# Patient Record
Sex: FEMALE | Race: WHITE | NOT HISPANIC OR LATINO | Employment: FULL TIME | URBAN - METROPOLITAN AREA
[De-identification: names, ages, dates, MRNs, and addresses within clinical notes are randomized per-mention and may not be internally consistent; named-entity substitution may affect disease eponyms.]

---
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
## 2022-12-06 ENCOUNTER — HOSPITAL ENCOUNTER (EMERGENCY)
Facility: HOSPITAL | Age: 22
Discharge: HOME | End: 2022-12-06
Attending: EMERGENCY MEDICINE
Payer: MEDICAID

## 2022-12-06 VITALS
OXYGEN SATURATION: 96 % | RESPIRATION RATE: 18 BRPM | HEART RATE: 115 BPM | BODY MASS INDEX: 53.66 KG/M2 | TEMPERATURE: 99 F | SYSTOLIC BLOOD PRESSURE: 133 MMHG | WEIGHT: 284 LBS | DIASTOLIC BLOOD PRESSURE: 86 MMHG

## 2022-12-06 DIAGNOSIS — R03.0 ELEVATED BP WITHOUT DIAGNOSIS OF HYPERTENSION: ICD-10-CM

## 2022-12-06 DIAGNOSIS — M25.511 ACUTE PAIN OF RIGHT SHOULDER: ICD-10-CM

## 2022-12-06 DIAGNOSIS — S46.911A STRAIN OF RIGHT SHOULDER, INITIAL ENCOUNTER: Primary | ICD-10-CM

## 2022-12-06 LAB
B-HCG UR QL: NEGATIVE
CTP QC/QA: YES

## 2022-12-06 PROCEDURE — 96372 THER/PROPH/DIAG INJ SC/IM: CPT | Performed by: EMERGENCY MEDICINE

## 2022-12-06 PROCEDURE — 99284 EMERGENCY DEPT VISIT MOD MDM: CPT | Mod: ER

## 2022-12-06 PROCEDURE — 81025 URINE PREGNANCY TEST: CPT | Mod: ER | Performed by: PHYSICIAN ASSISTANT

## 2022-12-06 PROCEDURE — 63600175 PHARM REV CODE 636 W HCPCS: Mod: ER | Performed by: EMERGENCY MEDICINE

## 2022-12-06 RX ORDER — METHOCARBAMOL 500 MG/1
1000 TABLET, FILM COATED ORAL 3 TIMES DAILY PRN
Qty: 20 TABLET | Refills: 0 | Status: SHIPPED | OUTPATIENT
Start: 2022-12-06 | End: 2022-12-11

## 2022-12-06 RX ORDER — KETOROLAC TROMETHAMINE 30 MG/ML
15 INJECTION, SOLUTION INTRAMUSCULAR; INTRAVENOUS
Status: COMPLETED | OUTPATIENT
Start: 2022-12-06 | End: 2022-12-06

## 2022-12-06 RX ORDER — METFORMIN HYDROCHLORIDE 500 MG/1
500 TABLET ORAL 2 TIMES DAILY WITH MEALS
COMMUNITY
End: 2023-06-28

## 2022-12-06 RX ORDER — NAPROXEN 500 MG/1
500 TABLET ORAL 2 TIMES DAILY PRN
Qty: 14 TABLET | Refills: 0 | OUTPATIENT
Start: 2022-12-06 | End: 2023-06-28

## 2022-12-06 RX ADMIN — KETOROLAC TROMETHAMINE 15 MG: 30 INJECTION, SOLUTION INTRAMUSCULAR at 11:12

## 2022-12-07 NOTE — DISCHARGE INSTRUCTIONS
Additional instructions  Followup with your primary care physician in 2-3 days if you are not improving. Take all your medications as prescribed. Return to the emergency department if you have increasing pain, chest pain, difficulty breathing,  nonstop vomiting, or any other concerns. Be sure to drink plenty of fluids to stay hydrated. Get plenty of rest. Please refer to additional educational material for further instructions.    Your blood pressure was over > 120/80 without history of hypertension you are advised to follow up with your PCP.  While elevated Blood pressures can be caused by many things including just coming to the emergency department, you will need to follow up with your primary care physician for further evaluation ideally in 2-3 days.

## 2022-12-07 NOTE — ED PROVIDER NOTES
Chief Complaint: right shoulder pain     History of Present Illness:    Nola Rooney 22 y.o. with a  has no past medical history Body mass index is 53.66 kg/m². who presents to the emergency department today with a complaint of right shoulder pain after a fall at work. Pt reports that she slipped on some water and fell to the ground hitting her left shoulder. She denies hitting her head. No LOC. This happened at 2pm. She worked through the rest of her shift. She did not take anything for pain prior to coming in tonight. Pain is worse with movement. No numbness or tingling to the hand. No other complaints.     ROS    Constitutional: No fever, no chills.  Eyes: No discharge. No pain.  ENT: No nasal drainage. No ear ache. No sore throat.  Cardiovascular: No chest pain, no palpitations.  Respiratory: No cough, no shortness of breath.  Gastrointestinal: No abdominal pain, no vomiting. No diarrhea.  Genitourinary: No hematuria, dysuria, urgency.  Musculoskeletal: No back pain.   Skin: No rashes, no lesions.  Neurological: No headache, no focal weakness.  Hematologic: Does not bleed easily.     Otherwise remaining ROS negative     The history is provided by the patient      Reviewed and verified by myself:   PMH/PSH/SOC/FH REVIEWED :  Nola Rooney  has no past medical history on file. and   has a past surgical history that includes Tonsillectomy and adnoids. with  reports that she is a non-smoker but has been exposed to tobacco smoke. She has never used smokeless tobacco. She reports that she does not drink alcohol and does not use drugs. and a family history includes Hypertension in her father and mother.      Nursing/Ancillary staff note reviewed.  ALLERGIES REVIEWED  MEDICATIONS REVIEWED  VS reviewed         Physical Exam     ED Triage Vitals [12/06/22 2122]   /86   Pulse (!) 115   Resp 18   Temp 99.1 °F (37.3 °C)   SpO2 96 %         Physical Exam  Vitals and nursing note reviewed.   Constitutional:        General: She is not in acute distress.     Appearance: Normal appearance. She is well-developed.   HENT:      Head: Normocephalic and atraumatic.      Right Ear: External ear normal.      Left Ear: External ear normal.      Nose: Nose normal.      Mouth/Throat:      Mouth: Mucous membranes are moist.   Eyes:      General: No scleral icterus.        Right eye: No discharge.         Left eye: No discharge.      Conjunctiva/sclera: Conjunctivae normal.   Cardiovascular:      Rate and Rhythm: Normal rate.   Pulmonary:      Effort: Pulmonary effort is normal. No respiratory distress.   Abdominal:      General: There is no distension.   Musculoskeletal:         General: Normal range of motion.      Right shoulder: Tenderness (along the anterior musculature) present. No swelling, deformity or bony tenderness. Normal strength. Normal pulse.        Arms:       Cervical back: Normal range of motion and neck supple.      Comments: Gait normal.    Skin:     General: Skin is warm and dry.   Neurological:      Mental Status: She is alert and oriented to person, place, and time.      Cranial Nerves: No cranial nerve deficit.      Motor: No abnormal muscle tone.         DIFFERENTIAL DIAGNOSIS: After history and physical exam a differential diagnosis was considered, but was not limited to, strain, sprain, fracture, dislocation            I independently interpreted the lab results and it showed the following: UPT Neg         Imaging Results              X-ray Shoulder 2 or More Views Right (Final result)  Result time 12/06/22 22:30:26      Final result by Tim Diaz MD (12/06/22 22:30:26)                   Impression:      As above      Electronically signed by: Joe Dumont  Date:    12/06/2022  Time:    22:30               Narrative:    EXAMINATION:  XR SHOULDER COMPLETE 2 OR MORE VIEWS RIGHT    CLINICAL HISTORY:  Right shoulder pain;    TECHNIQUE:  Two or three views of the right shoulder were  performed.    COMPARISON:  None    FINDINGS:  Limited positioning with arm extended.  No definite evidence for acute fracture or dislocation.  Recommend clinical correlation and follow-up as clinically indicated.                                                    ED Course     Medications   ketorolac injection 15 mg (15 mg Intramuscular Given 12/6/22 2300)                     Medical Decision Making:         Clinical Tests:   Radiological Study: Ordered and Reviewed      ED Management:  Nola Rooney  presents to the emergency Department today with strain to her right shoulder, after a fall at work. Umberto without signs of fracture, dislocation. She is relieved. I will write her for pain/inflammation control and she'll follow up with her PCP.  The pt had an elevated blood pressure here in the emergency department.  While this could be causing many different things such as pain and even just being in the emergency department the patient understands the need to follow-up with his PCP for further management.  They will need to follow up with their PCP for further evaluation and management in 2-3 days. The pt is comfortable with this plan and comfortable going home at this time. After taking into careful account the historical factors and physical exam findings of the patient's presentation today, in conjunction with the empirical and objective data obtained on ED workup, no acute emergent medical condition requiring admission has been identified. The patient appears to be low risk for an emergent medical condition and I feel it is safe and appropriate at this time for the patient to be discharged to follow-up as detailed in their discharge instructions for reevaluation and possible continued outpatient workup and management. Regardless, an unremarkable evaluation in the ED does not preclude the development or presence of a serious or life threatening condition. As such, patient was instructed to return immediately for  any worsening or change in current symptoms. Precautions for return discussed at length.  Discharge and follow-up instructions discussed with the patient who expressed understanding and willingness to comply with my recommendations.    Voice recognition software utilized in this note.              Impression      The primary encounter diagnosis was Strain of right shoulder, initial encounter. Diagnoses of Acute pain of right shoulder and Elevated BP without diagnosis of hypertension were also pertinent to this visit.          New Prescriptions    METHOCARBAMOL (ROBAXIN) 500 MG TAB    Take 2 tablets (1,000 mg total) by mouth 3 (three) times daily as needed (muscle pain or spasm).    NAPROXEN (NAPROSYN) 500 MG TABLET    Take 1 tablet (500 mg total) by mouth 2 (two) times daily as needed (pain).        Follow-up Information       Your PCP. Schedule an appointment as soon as possible for a visit in 3 days.                                          Mally Prajapati MD  12/06/22 9742

## 2022-12-15 ENCOUNTER — HOSPITAL ENCOUNTER (EMERGENCY)
Facility: HOSPITAL | Age: 22
Discharge: HOME | End: 2022-12-16
Attending: STUDENT IN AN ORGANIZED HEALTH CARE EDUCATION/TRAINING PROGRAM
Payer: MEDICAID

## 2022-12-15 DIAGNOSIS — R07.9 CHEST PAIN: ICD-10-CM

## 2022-12-15 LAB
B-HCG UR QL: NEGATIVE
CTP QC/QA: YES
INFLUENZA A, MOLECULAR: NEGATIVE
INFLUENZA B, MOLECULAR: NEGATIVE
SARS-COV-2 RDRP RESP QL NAA+PROBE: NEGATIVE
SPECIMEN SOURCE: NORMAL

## 2022-12-15 PROCEDURE — 25000003 PHARM REV CODE 250: Mod: ER | Performed by: STUDENT IN AN ORGANIZED HEALTH CARE EDUCATION/TRAINING PROGRAM

## 2022-12-15 PROCEDURE — 99285 EMERGENCY DEPT VISIT HI MDM: CPT | Mod: 25,ER

## 2022-12-15 PROCEDURE — 93010 EKG 12-LEAD: ICD-10-PCS | Mod: ,,, | Performed by: INTERNAL MEDICINE

## 2022-12-15 PROCEDURE — 96361 HYDRATE IV INFUSION ADD-ON: CPT | Mod: ER

## 2022-12-15 PROCEDURE — 81025 URINE PREGNANCY TEST: CPT | Mod: ER | Performed by: STUDENT IN AN ORGANIZED HEALTH CARE EDUCATION/TRAINING PROGRAM

## 2022-12-15 PROCEDURE — U0002 COVID-19 LAB TEST NON-CDC: HCPCS | Mod: ER | Performed by: STUDENT IN AN ORGANIZED HEALTH CARE EDUCATION/TRAINING PROGRAM

## 2022-12-15 PROCEDURE — 93010 ELECTROCARDIOGRAM REPORT: CPT | Mod: ,,, | Performed by: INTERNAL MEDICINE

## 2022-12-15 PROCEDURE — 87502 INFLUENZA DNA AMP PROBE: CPT | Mod: ER | Performed by: STUDENT IN AN ORGANIZED HEALTH CARE EDUCATION/TRAINING PROGRAM

## 2022-12-15 PROCEDURE — 93005 ELECTROCARDIOGRAM TRACING: CPT | Mod: ER

## 2022-12-15 RX ADMIN — SODIUM CHLORIDE 1000 ML: 0.9 INJECTION, SOLUTION INTRAVENOUS at 11:12

## 2022-12-16 VITALS
OXYGEN SATURATION: 98 % | HEIGHT: 60 IN | BODY MASS INDEX: 55.76 KG/M2 | RESPIRATION RATE: 24 BRPM | WEIGHT: 284 LBS | DIASTOLIC BLOOD PRESSURE: 68 MMHG | HEART RATE: 114 BPM | SYSTOLIC BLOOD PRESSURE: 123 MMHG | TEMPERATURE: 99 F

## 2022-12-16 LAB
ANION GAP SERPL CALC-SCNC: 9 MMOL/L (ref 8–16)
BASOPHILS # BLD AUTO: 0.06 K/UL (ref 0–0.2)
BASOPHILS NFR BLD: 0.3 % (ref 0–1.9)
CALCIUM SERPL-MCNC: 9.4 MG/DL (ref 8.7–10.5)
CHLORIDE SERPL-SCNC: 105 MMOL/L (ref 95–110)
CO2 SERPL-SCNC: 26 MMOL/L (ref 23–29)
CREAT SERPL-MCNC: 0.59 MG/DL (ref 0.5–1.4)
D DIMER PPP IA.FEU-MCNC: 0.19 MG/L FEU
DIFFERENTIAL METHOD: ABNORMAL
EOSINOPHIL # BLD AUTO: 0 K/UL (ref 0–0.5)
EOSINOPHIL NFR BLD: 0.2 % (ref 0–8)
ERYTHROCYTE [DISTWIDTH] IN BLOOD BY AUTOMATED COUNT: 13.7 % (ref 11.5–14.5)
EST. GFR  (NO RACE VARIABLE): >60 ML/MIN/1.73 M^2
GLUCOSE SERPL-MCNC: 106 MG/DL (ref 70–110)
HCT VFR BLD AUTO: 42 % (ref 37–48.5)
HGB BLD-MCNC: 13.4 G/DL (ref 12–16)
IMM GRANULOCYTES # BLD AUTO: 0.06 K/UL (ref 0–0.04)
IMM GRANULOCYTES NFR BLD AUTO: 0.3 % (ref 0–0.5)
LYMPHOCYTES # BLD AUTO: 3.2 K/UL (ref 1–4.8)
LYMPHOCYTES NFR BLD: 17.7 % (ref 18–48)
MCH RBC QN AUTO: 27.5 PG (ref 27–31)
MCHC RBC AUTO-ENTMCNC: 31.9 G/DL (ref 32–36)
MCV RBC AUTO: 86 FL (ref 82–98)
MONOCYTES # BLD AUTO: 0.9 K/UL (ref 0.3–1)
MONOCYTES NFR BLD: 4.8 % (ref 4–15)
NEUTROPHILS # BLD AUTO: 13.7 K/UL (ref 1.8–7.7)
NEUTROPHILS NFR BLD: 76.7 % (ref 38–73)
NRBC BLD-RTO: 0 /100 WBC
PLATELET # BLD AUTO: 401 K/UL (ref 150–450)
PMV BLD AUTO: 11.1 FL (ref 9.2–12.9)
POTASSIUM SERPL-SCNC: 4.3 MMOL/L (ref 3.5–5.1)
RBC # BLD AUTO: 4.88 M/UL (ref 4–5.4)
SODIUM SERPL-SCNC: 140 MMOL/L (ref 136–145)
TROPONIN I SERPL-MCNC: <0.012 NG/ML (ref 0.01–0.03)
UUN UR-MCNC: 11 MG/DL (ref 7–17)
WBC # BLD AUTO: 17.83 K/UL (ref 3.9–12.7)

## 2022-12-16 PROCEDURE — 84484 ASSAY OF TROPONIN QUANT: CPT | Mod: ER | Performed by: STUDENT IN AN ORGANIZED HEALTH CARE EDUCATION/TRAINING PROGRAM

## 2022-12-16 PROCEDURE — 85025 COMPLETE CBC W/AUTO DIFF WBC: CPT | Mod: ER | Performed by: STUDENT IN AN ORGANIZED HEALTH CARE EDUCATION/TRAINING PROGRAM

## 2022-12-16 PROCEDURE — 96374 THER/PROPH/DIAG INJ IV PUSH: CPT | Mod: ER

## 2022-12-16 PROCEDURE — 63600175 PHARM REV CODE 636 W HCPCS: Mod: ER | Performed by: STUDENT IN AN ORGANIZED HEALTH CARE EDUCATION/TRAINING PROGRAM

## 2022-12-16 PROCEDURE — 80048 BASIC METABOLIC PNL TOTAL CA: CPT | Mod: ER | Performed by: STUDENT IN AN ORGANIZED HEALTH CARE EDUCATION/TRAINING PROGRAM

## 2022-12-16 PROCEDURE — 85379 FIBRIN DEGRADATION QUANT: CPT | Mod: ER | Performed by: STUDENT IN AN ORGANIZED HEALTH CARE EDUCATION/TRAINING PROGRAM

## 2022-12-16 RX ORDER — KETOROLAC TROMETHAMINE 30 MG/ML
15 INJECTION, SOLUTION INTRAMUSCULAR; INTRAVENOUS
Status: COMPLETED | OUTPATIENT
Start: 2022-12-16 | End: 2022-12-16

## 2022-12-16 RX ORDER — KETOROLAC TROMETHAMINE 10 MG/1
10 TABLET, FILM COATED ORAL EVERY 8 HOURS
Qty: 15 TABLET | Refills: 0 | Status: SHIPPED | OUTPATIENT
Start: 2022-12-16 | End: 2022-12-21

## 2022-12-16 RX ADMIN — KETOROLAC TROMETHAMINE 15 MG: 30 INJECTION, SOLUTION INTRAMUSCULAR at 01:12

## 2022-12-16 NOTE — ED PROVIDER NOTES
Encounter Date: 12/15/2022       History     Chief Complaint   Patient presents with    Chest Pain     Pt to the Er with c/o chest pain for 1 hour PTA. Pt reports she was at work when it started, took a Naproxen with no relief. Pt reports coughing and burning in chest, nausea also.      22-year-old female with PCOS presents for evaluation due to chest pain onset approximately 1 hour ago.  Onset of work in the midst of a coughing episode.  Close contact recently diagnosed with influenza.    The history is provided by the patient and medical records.   Chest Pain  The current episode started 1 to 2 hours ago. Chest pain occurs constantly. The chest pain is unchanged. The pain is associated with coughing. The quality of the pain is described as aching. Primary symptoms include cough and nausea. Pertinent negatives for primary symptoms include no fever, no fatigue, no shortness of breath, no palpitations, no abdominal pain, no vomiting, no dizziness and no altered mental status.   Pertinent negatives for associated symptoms include no numbness and no weakness.   Review of patient's allergies indicates:   Allergen Reactions    Sulfa (sulfonamide antibiotics)      Past Medical History:   Diagnosis Date    Polycystic ovarian disease      Past Surgical History:   Procedure Laterality Date    adnoids      TONSILLECTOMY       Family History   Problem Relation Age of Onset    Hypertension Father     Hypertension Mother      Social History     Tobacco Use    Smoking status: Never     Passive exposure: Yes    Smokeless tobacco: Never   Substance Use Topics    Alcohol use: No    Drug use: No     Review of Systems   Constitutional:  Negative for chills, fatigue and fever.   HENT:  Negative for congestion, dental problem, ear pain, facial swelling, nosebleeds and sinus pressure.    Eyes:  Negative for pain and discharge.   Respiratory:  Positive for cough. Negative for shortness of breath and stridor.    Cardiovascular:  Positive  for chest pain. Negative for palpitations.   Gastrointestinal:  Positive for nausea. Negative for abdominal distention, abdominal pain, constipation, diarrhea and vomiting.   Genitourinary:  Negative for difficulty urinating, flank pain, frequency and urgency.   Musculoskeletal:  Negative for arthralgias, myalgias and neck pain.   Skin:  Negative for rash and wound.   Neurological:  Negative for dizziness, syncope, weakness, numbness and headaches.   Psychiatric/Behavioral:  Negative for hallucinations and suicidal ideas.    All other systems reviewed and are negative.    Physical Exam     Initial Vitals [12/15/22 2224]   BP Pulse Resp Temp SpO2   (!) 140/102 (!) 133 (!) 24 99 °F (37.2 °C) 99 %      MAP       --         Physical Exam    Nursing note and vitals reviewed.  Constitutional: She appears well-developed and well-nourished. She is Obese . No distress.   HENT:   Head: Normocephalic and atraumatic.   Right Ear: External ear normal.   Left Ear: External ear normal.   Nose: Nose normal.   Mouth/Throat: Oropharynx is clear and moist.   Eyes: Conjunctivae and EOM are normal. Pupils are equal, round, and reactive to light.   Neck: Neck supple.   Normal range of motion.  Cardiovascular:  Normal rate, regular rhythm, normal heart sounds and intact distal pulses.           Pulmonary/Chest: Breath sounds normal. No stridor. No respiratory distress. She has no wheezes. She has no rhonchi. She has no rales.   Abdominal: Abdomen is soft. Bowel sounds are normal. There is no abdominal tenderness.   Musculoskeletal:         General: No tenderness or edema. Normal range of motion.      Cervical back: Normal range of motion and neck supple.     Neurological: She is alert and oriented to person, place, and time. She has normal strength. No cranial nerve deficit or sensory deficit.   Skin: Skin is warm and dry. No rash noted.   Psychiatric: She has a normal mood and affect. Thought content normal.       ED Course    Procedures  Labs Reviewed   CBC W/ AUTO DIFFERENTIAL - Abnormal; Notable for the following components:       Result Value    WBC 17.83 (*)     MCHC 31.9 (*)     Gran # (ANC) 13.7 (*)     Immature Grans (Abs) 0.06 (*)     Gran % 76.7 (*)     Lymph % 17.7 (*)     All other components within normal limits   INFLUENZA A & B BY MOLECULAR   SARS-COV-2 RNA AMPLIFICATION, QUAL    Narrative:     Is the patient symptomatic?->Yes   BASIC METABOLIC PANEL   D DIMER, QUANTITATIVE   TROPONIN I   POCT URINE PREGNANCY     EKG Readings: (Independently Interpreted)   Initial Reading: No STEMI. Rhythm: Sinus Tachycardia. Heart Rate: 119. Ectopy: No Ectopy. Conduction: Normal. ST Segments: Normal ST Segments. T Waves: Normal. Axis: Normal. Clinical Impression: Normal Sinus Rhythm     Imaging Results              X-Ray Chest AP Portable (Preliminary result)  Result time 12/16/22 01:45:21      ED Interpretation by Ariel Trejo MD (12/16/22 01:45:21, [facility] - Emergency Dept, Emergency Medicine)    No acute findings                                  X-Rays:   Independently Interpreted Readings:   Chest X-Ray: No acute abnormalities.   Medications   sodium chloride 0.9% bolus 1,000 mL 1,000 mL (0 mLs Intravenous Stopped 12/16/22 0055)   ketorolac injection 15 mg (15 mg Intravenous Given 12/16/22 0148)     Medical Decision Making:   Independently Interpreted Test(s):   I have ordered and independently interpreted X-rays - see prior notes.  I have ordered and independently interpreted EKG Reading(s) - see prior notes  Clinical Tests:   Lab Tests: Ordered and Reviewed  Radiological Study: Ordered and Reviewed  Medical Tests: Reviewed and Ordered  22-year-old female with chest pain.  Low risk per Wells criteria with negative D-dimer making PE very unlikely.  Very low risk for ACS given age, gender, risk factors.  Chest x-ray clear.  Does not appear to have life-threatening or emergent cause of chest pain at this time.  Will trial  Toradol as musculoskeletal agent to help with symptoms.  Advised follow-up with PCP.  Return ED precautions given for worsening or changing symptoms.  Additional MDM:     Well's Criteria Score:  -Clinical symptoms of DVT (leg swelling, pain with palpation) = 0.0  -Other diagnosis less likely than pulmonary embolism =            0.0  -Heart Rate >100 =   1.5  -Immobilization (= or > than 3 days) or surgery in the previous 4 weeks = 0.0  -Previous DVT/PE = 0.0  -Hemoptysis =          0.0  -Malignancy =           0.0  Well's Probability Score =    1.5      Heart Score:    History:          Slightly suspicious.  ECG:             Normal  Age:               Less than 45 years  Risk factors: 1-2 risk factors  Troponin:       Less than or equal to normal limit  Final Score: 1                       Clinical Impression:   Final diagnoses:  [R07.9] Chest pain        ED Disposition Condition    Discharge Stable          ED Prescriptions       Medication Sig Dispense Start Date End Date Auth. Provider    ketorolac (TORADOL) 10 mg tablet Take 1 tablet (10 mg total) by mouth every 8 (eight) hours. for 5 days 15 tablet 12/16/2022 12/21/2022 Airel Trejo MD          Follow-up Information       Follow up With Specialties Details Why Contact Info    Primary care provider of your choice  Schedule an appointment as soon as possible for a visit in 5 days For follow-up on today's visit.     [facility] - Emergency Dept Emergency Medicine Go to  As needed, If symptoms worsen [address] [facility] [id number] [phone]             Ariel Trejo MD  12/16/22 1228

## 2022-12-16 NOTE — ED NOTES
No s/s of distress noted. Pt visualized, respirations even and unlabored, side rails up x 2, bed locked and in low position. Call bell with in reach. All needs currently addressed and met. Warm blanket provided  Instruct to call with problems, needs, or concerns.   Will continue to monitor.

## 2023-01-01 ENCOUNTER — HOSPITAL ENCOUNTER (EMERGENCY)
Facility: HOSPITAL | Age: 23
Discharge: HOME | End: 2023-01-01
Attending: EMERGENCY MEDICINE
Payer: MEDICAID

## 2023-01-01 VITALS
OXYGEN SATURATION: 98 % | WEIGHT: 271 LBS | DIASTOLIC BLOOD PRESSURE: 82 MMHG | BODY MASS INDEX: 53.2 KG/M2 | SYSTOLIC BLOOD PRESSURE: 143 MMHG | TEMPERATURE: 98 F | RESPIRATION RATE: 20 BRPM | HEIGHT: 60 IN | HEART RATE: 84 BPM

## 2023-01-01 DIAGNOSIS — R11.2 NAUSEA AND VOMITING, UNSPECIFIED VOMITING TYPE: Primary | ICD-10-CM

## 2023-01-01 DIAGNOSIS — N94.6 MENSTRUAL PAIN: ICD-10-CM

## 2023-01-01 LAB
B-HCG UR QL: NEGATIVE
CTP QC/QA: YES

## 2023-01-01 PROCEDURE — 63600175 PHARM REV CODE 636 W HCPCS: Mod: ER | Performed by: PHYSICIAN ASSISTANT

## 2023-01-01 PROCEDURE — 96372 THER/PROPH/DIAG INJ SC/IM: CPT | Performed by: PHYSICIAN ASSISTANT

## 2023-01-01 PROCEDURE — 25000003 PHARM REV CODE 250: Mod: ER | Performed by: PHYSICIAN ASSISTANT

## 2023-01-01 PROCEDURE — 99284 EMERGENCY DEPT VISIT MOD MDM: CPT | Mod: ER

## 2023-01-01 PROCEDURE — 81025 URINE PREGNANCY TEST: CPT | Mod: ER | Performed by: PHYSICIAN ASSISTANT

## 2023-01-01 RX ORDER — ONDANSETRON 4 MG/1
4 TABLET, ORALLY DISINTEGRATING ORAL
Status: COMPLETED | OUTPATIENT
Start: 2023-01-01 | End: 2023-01-01

## 2023-01-01 RX ORDER — ONDANSETRON 4 MG/1
4 TABLET, ORALLY DISINTEGRATING ORAL EVERY 6 HOURS PRN
Qty: 30 TABLET | Refills: 0 | OUTPATIENT
Start: 2023-01-01 | End: 2023-06-28

## 2023-01-01 RX ORDER — KETOROLAC TROMETHAMINE 10 MG/1
10 TABLET, FILM COATED ORAL EVERY 6 HOURS
Qty: 12 TABLET | Refills: 0 | Status: SHIPPED | OUTPATIENT
Start: 2023-01-01 | End: 2023-01-04

## 2023-01-01 RX ORDER — KETOROLAC TROMETHAMINE 30 MG/ML
30 INJECTION, SOLUTION INTRAMUSCULAR; INTRAVENOUS
Status: COMPLETED | OUTPATIENT
Start: 2023-01-01 | End: 2023-01-01

## 2023-01-01 RX ORDER — FLUCONAZOLE 150 MG/1
TABLET ORAL
COMMUNITY
Start: 2022-09-29 | End: 2023-06-28

## 2023-01-01 RX ADMIN — ONDANSETRON 4 MG: 4 TABLET, ORALLY DISINTEGRATING ORAL at 03:01

## 2023-01-01 RX ADMIN — KETOROLAC TROMETHAMINE 30 MG: 30 INJECTION, SOLUTION INTRAMUSCULAR; INTRAVENOUS at 03:01

## 2023-01-01 NOTE — Clinical Note
"Nola "Hernán Rooney was seen and treated in our emergency department on 1/1/2023.  She may return to work on 01/02/2023.       If you have any questions or concerns, please don't hesitate to call.      Deysi Guerin PA-C"

## 2023-01-02 NOTE — ED PROVIDER NOTES
Encounter Date: 1/1/2023       History     Chief Complaint   Patient presents with    Abdominal Pain     Pt had a PMH of PCOS, needs work excuse. Pt took Ibuprofen 1 hour prior to arrival.      HPI: Nola Rooney, a 22 y.o. female  has a past medical history of Polycystic ovarian disease.     She presents to the ED for evaluation of abdominal pain associated with the start of her menstrual cycle.  States that she takes ibuprofen with little improvement.  Attests to 2 episodes of vomiting. Pain and vomiting similar to previous episodes.          The history is provided by the patient.   Review of patient's allergies indicates:   Allergen Reactions    Sulfa (sulfonamide antibiotics)      Past Medical History:   Diagnosis Date    Polycystic ovarian disease      Past Surgical History:   Procedure Laterality Date    adnoids      TONSILLECTOMY       Family History   Problem Relation Age of Onset    Hypertension Father     Hypertension Mother      Social History     Tobacco Use    Smoking status: Never     Passive exposure: Yes    Smokeless tobacco: Never   Substance Use Topics    Alcohol use: No    Drug use: No     Review of Systems   Constitutional:  Negative for fever.   Gastrointestinal:  Positive for abdominal pain, nausea and vomiting.   Genitourinary:  Positive for menstrual problem.   Skin:  Negative for color change and rash.   Allergic/Immunologic: Negative for immunocompromised state.   Neurological:  Negative for weakness.   Hematological:  Negative for adenopathy.   Psychiatric/Behavioral:  Negative for agitation.    All other systems reviewed and are negative.    Physical Exam     Initial Vitals [01/01/23 1343]   BP Pulse Resp Temp SpO2   (!) 143/82 84 20 98.2 °F (36.8 °C) 98 %      MAP       --         Physical Exam    Nursing note and vitals reviewed.  Constitutional: She appears well-developed and well-nourished. She is not diaphoretic. No distress.   HENT:   Head: Normocephalic and atraumatic.   Right  Ear: External ear normal.   Left Ear: External ear normal.   Nose: Nose normal.   Eyes: Conjunctivae and EOM are normal.   Neck:   Normal range of motion.  Cardiovascular:  Normal rate and regular rhythm.           Pulmonary/Chest: No respiratory distress. She has no wheezes. She has no rhonchi. She has no rales.   Abdominal: Abdomen is soft. Bowel sounds are normal. She exhibits no distension. There is no abdominal tenderness. There is no rebound and no guarding.   Musculoskeletal:         General: Normal range of motion.      Cervical back: Normal range of motion.     Neurological: She is alert and oriented to person, place, and time.   Skin: Skin is warm. Capillary refill takes less than 2 seconds. No rash noted.   Psychiatric: She has a normal mood and affect. Thought content normal.       ED Course   Procedures  Labs Reviewed   POCT URINE PREGNANCY          Imaging Results    None          Medications   ondansetron disintegrating tablet 4 mg (4 mg Oral Given 1/1/23 1559)   ketorolac injection 30 mg (30 mg Intramuscular Given 1/1/23 1559)     Medical Decision Making:   Initial Assessment:   N/V and menstrual cramping   Differential Diagnosis:   Gastroenteritis, dehydration, menstrual issue      ED Management:  Pt presents to ED for evaluation of N/V with menstrual cramps. Abdomen NTTP.  MMM.  Given toradol and zofran with cessation of vomiting and improvement of pain.  She will be given a short course of anti-inflammatory and instructed to f/u with OB for further evaluation.                          Clinical Impression:   Final diagnoses:  [R11.2] Nausea and vomiting, unspecified vomiting type (Primary)  [N94.6] Menstrual pain        ED Disposition Condition    Discharge Stable          ED Prescriptions       Medication Sig Dispense Start Date End Date Auth. Provider    ondansetron (ZOFRAN-ODT) 4 MG TbDL Take 1 tablet (4 mg total) by mouth every 6 (six) hours as needed (nausea/vomiting). 30 tablet 1/1/2023 --  Deysi Guerin PA-C    ketorolac (TORADOL) 10 mg tablet Take 1 tablet (10 mg total) by mouth every 6 (six) hours. for 3 days 12 tablet 1/1/2023 1/4/2023 Deysi Guerin PA-C          Follow-up Information    None          Deysi Guerin PA-C  01/01/23 1912

## 2023-02-09 ENCOUNTER — HOSPITAL ENCOUNTER (EMERGENCY)
Facility: HOSPITAL | Age: 23
Discharge: HOME | End: 2023-02-10
Attending: EMERGENCY MEDICINE
Payer: MEDICAID

## 2023-02-09 DIAGNOSIS — G43.809 OTHER MIGRAINE WITHOUT STATUS MIGRAINOSUS, NOT INTRACTABLE: Primary | ICD-10-CM

## 2023-02-09 PROCEDURE — 99284 EMERGENCY DEPT VISIT MOD MDM: CPT | Mod: 25,ER

## 2023-02-09 PROCEDURE — 63600175 PHARM REV CODE 636 W HCPCS: Mod: ER | Performed by: EMERGENCY MEDICINE

## 2023-02-09 PROCEDURE — 96375 TX/PRO/DX INJ NEW DRUG ADDON: CPT | Mod: ER

## 2023-02-09 PROCEDURE — 96374 THER/PROPH/DIAG INJ IV PUSH: CPT | Mod: ER

## 2023-02-09 RX ORDER — DIPHENHYDRAMINE HYDROCHLORIDE 50 MG/ML
12.5 INJECTION INTRAMUSCULAR; INTRAVENOUS
Status: COMPLETED | OUTPATIENT
Start: 2023-02-09 | End: 2023-02-09

## 2023-02-09 RX ORDER — KETOROLAC TROMETHAMINE 30 MG/ML
15 INJECTION, SOLUTION INTRAMUSCULAR; INTRAVENOUS
Status: COMPLETED | OUTPATIENT
Start: 2023-02-09 | End: 2023-02-09

## 2023-02-09 RX ORDER — BUTALBITAL, ACETAMINOPHEN AND CAFFEINE 50; 325; 40 MG/1; MG/1; MG/1
1 TABLET ORAL EVERY 4 HOURS PRN
Qty: 2 TABLET | Refills: 0 | Status: SHIPPED | OUTPATIENT
Start: 2023-02-09 | End: 2023-03-11

## 2023-02-09 RX ORDER — PROCHLORPERAZINE EDISYLATE 5 MG/ML
10 INJECTION INTRAMUSCULAR; INTRAVENOUS
Status: COMPLETED | OUTPATIENT
Start: 2023-02-09 | End: 2023-02-09

## 2023-02-09 RX ADMIN — DIPHENHYDRAMINE HYDROCHLORIDE 12.5 MG: 50 INJECTION, SOLUTION INTRAMUSCULAR; INTRAVENOUS at 11:02

## 2023-02-09 RX ADMIN — KETOROLAC TROMETHAMINE 15 MG: 30 INJECTION, SOLUTION INTRAMUSCULAR; INTRAVENOUS at 11:02

## 2023-02-09 RX ADMIN — PROCHLORPERAZINE EDISYLATE 10 MG: 5 INJECTION INTRAMUSCULAR; INTRAVENOUS at 11:02

## 2023-02-10 VITALS
TEMPERATURE: 99 F | HEIGHT: 60 IN | HEART RATE: 107 BPM | BODY MASS INDEX: 53.2 KG/M2 | WEIGHT: 271 LBS | DIASTOLIC BLOOD PRESSURE: 87 MMHG | SYSTOLIC BLOOD PRESSURE: 141 MMHG | RESPIRATION RATE: 19 BRPM | OXYGEN SATURATION: 99 %

## 2023-02-10 NOTE — ED PROVIDER NOTES
NAME:  Nola Rooney  CSN:     [id number]  MRN:    [id number]  ADMIT DATE: 2/9/2023        eMERGENCY dEPARTMENT eNCOUnter    CHIEF COMPLAINT    Chief Complaint   Patient presents with    Headache     Reports to ED c c/o HA x 2 weeks. States causes dizziness when turning HA. +Photophobia. +N/V. Hx of PCOS. Took tylenol 1 hr pta; denies relief        HPI      Nola Rooney is a 22 y.o. female who presents to the ED for evaluation of a headache with nausea.  Patient complains of photophobia.  Taking Tylenol at home with no relief.  She denies any neck stiffness or fevers          ALLERGIES    Review of patient's allergies indicates:   Allergen Reactions    Sulfa (sulfonamide antibiotics)        PAST MEDICAL HISTORY  Past Medical History:   Diagnosis Date    Polycystic ovarian disease        SURGICAL HISTORY    Past Surgical History:   Procedure Laterality Date    adnoids      TONSILLECTOMY         SOCIAL HISTORY    Social History     Socioeconomic History    Marital status: Single   Tobacco Use    Smoking status: Never     Passive exposure: Yes    Smokeless tobacco: Never   Substance and Sexual Activity    Alcohol use: No    Drug use: No    Sexual activity: Yes     Partners: Male       FAMILY HISTORY    Family History   Problem Relation Age of Onset    Hypertension Father     Hypertension Mother        REVIEW OF SYSTEMS   ROS  All Systems otherwise negative except as noted in the History of Present Illness.        PHYSICAL EXAM    Reviewed Triage Note  VITAL SIGNS:   ED Triage Vitals [02/09/23 2256]   Enc Vitals Group      BP (!) 141/87      Pulse 107      Resp 19      Temp 98.9 °F (37.2 °C)      Temp src Oral      SpO2 99 %      Weight 271 lb      Height 5'      Head Circumference       Peak Flow       Pain Score       Pain Loc       Pain Edu?       Excl. in GC?        Patient Vitals for the past 24 hrs:   BP Temp Temp src Pulse Resp SpO2 Height Weight   02/09/23 2256 (!) 141/87 98.9 °F (37.2 °C) Oral 107 19 99 %  5' (1.524 m) 122.9 kg (271 lb)           Physical Exam    Constitutional:  Well-developed, well-nourished. No acute distress  HENT:  Normocephalic, atraumatic.  Eyes:  EOMI. Conjunctiva normal without discharge.   Neck: Normal range of motion.No stridor. No meningismus.   Respiratory:  No respiratory distress, retractions, or conversational dyspnea.   Cardiovascular:  Normal heart rate. No pitting lower extremity edema.   Musculoskeletal:  No gross deformity or limited range of motion of all major joints.   Integument:  Warm and dry. No rash.  Neurologic:  Normal motor function. No focal deficits noted. Alert and Interactive.  Psychiatric:  Affect normal. Mood normal.         LABS  Pertinent labs reviewed. (See chart for details)   Labs Reviewed - No data to display      RADIOLOGY    Imaging Results    None         PROCEDURES    Procedures      EKG     Interpreted by ERP:         ED COURSE & MEDICAL DECISION MAKING    Pertinent & Imaging studies reviewed. (See chart for details and specific orders.)        Medications   ketorolac injection 15 mg (15 mg Intravenous Given 2/9/23 2347)   prochlorperazine injection Soln 10 mg (10 mg Intravenous Given 2/9/23 2347)   diphenhydrAMINE injection 12.5 mg (12.5 mg Intravenous Given 2/9/23 2346)          Patient reports significant improvement after pain medication.  She will be discharged with Fioricet       DISPOSITION  Patient discharged in stable condition at No discharge date for patient encounter.      DISCHARGE INSTRUCTIONS & MEDS    @DISCHARGEMEDSLIST(<NOROUTINE> error)@      New Prescriptions    BUTALBITAL-ACETAMINOPHEN-CAFFEINE -40 MG (FIORICET, ESGIC) -40 MG PER TABLET    Take 1 tablet by mouth every 4 (four) hours as needed for Pain.           FINAL IMPRESSION    1. Other migraine without status migrainosus, not intractable              Blood Pressure Follow-Up Advised  Patient advised to follow up with PCP within 3-5 days for blood pressure re-check if  blood pressure is equal to or greater than 120/80.         Critical care time spent with this patient (not including separately billable items) was  0 minutes.     DISCLAIMER: This note was prepared with Dragon NaturallySpeaking voice recognition transcription software. Garbled syntax, mangled pronouns, and other bizarre constructions may be attributed to that software system.      Naldo Lindsey MD  02/09/2023  11:57 PM           Naldo Lindsey MD  02/09/23 [id number]

## 2023-05-07 ENCOUNTER — HOSPITAL ENCOUNTER (EMERGENCY)
Facility: HOSPITAL | Age: 23
Discharge: HOME | End: 2023-05-07
Attending: EMERGENCY MEDICINE
Payer: MEDICAID

## 2023-05-07 VITALS
TEMPERATURE: 98 F | SYSTOLIC BLOOD PRESSURE: 138 MMHG | HEART RATE: 109 BPM | RESPIRATION RATE: 18 BRPM | DIASTOLIC BLOOD PRESSURE: 89 MMHG | WEIGHT: 293 LBS | OXYGEN SATURATION: 99 % | BODY MASS INDEX: 58.39 KG/M2

## 2023-05-07 DIAGNOSIS — K52.9 GASTROENTERITIS: Primary | ICD-10-CM

## 2023-05-07 LAB
ALBUMIN SERPL BCP-MCNC: 4.2 G/DL (ref 3.5–5.2)
ALP SERPL-CCNC: 105 U/L (ref 38–126)
ALT SERPL W/O P-5'-P-CCNC: 23 U/L (ref 10–44)
AMORPH CRY UR QL COMP ASSIST: ABNORMAL
ANION GAP SERPL CALC-SCNC: 9 MMOL/L (ref 8–16)
AST SERPL-CCNC: 20 U/L (ref 15–46)
B-HCG UR QL: NEGATIVE
BACTERIA #/AREA URNS AUTO: ABNORMAL /HPF
BASOPHILS # BLD AUTO: 0.04 K/UL (ref 0–0.2)
BASOPHILS NFR BLD: 0.2 % (ref 0–1.9)
BILIRUB SERPL-MCNC: 0.6 MG/DL (ref 0.1–1)
BILIRUB UR QL STRIP: NEGATIVE
CALCIUM SERPL-MCNC: 8.8 MG/DL (ref 8.7–10.5)
CHLORIDE SERPL-SCNC: 104 MMOL/L (ref 95–110)
CLARITY UR REFRACT.AUTO: ABNORMAL
CO2 SERPL-SCNC: 26 MMOL/L (ref 23–29)
COLOR UR AUTO: YELLOW
CREAT SERPL-MCNC: 0.58 MG/DL (ref 0.5–1.4)
CTP QC/QA: YES
DIFFERENTIAL METHOD: ABNORMAL
EOSINOPHIL # BLD AUTO: 0.1 K/UL (ref 0–0.5)
EOSINOPHIL NFR BLD: 0.6 % (ref 0–8)
ERYTHROCYTE [DISTWIDTH] IN BLOOD BY AUTOMATED COUNT: 14.6 % (ref 11.5–14.5)
EST. GFR  (NO RACE VARIABLE): >60 ML/MIN/1.73 M^2
GLUCOSE SERPL-MCNC: 102 MG/DL (ref 70–110)
GLUCOSE UR QL STRIP: NEGATIVE
HCG INTACT+B SERPL-ACNC: <2.39 MIU/ML
HCT VFR BLD AUTO: 41.4 % (ref 37–48.5)
HGB BLD-MCNC: 12.9 G/DL (ref 12–16)
HGB UR QL STRIP: NEGATIVE
IMM GRANULOCYTES # BLD AUTO: 0.05 K/UL (ref 0–0.04)
IMM GRANULOCYTES NFR BLD AUTO: 0.3 % (ref 0–0.5)
KETONES UR QL STRIP: NEGATIVE
LEUKOCYTE ESTERASE UR QL STRIP: NEGATIVE
LYMPHOCYTES # BLD AUTO: 3 K/UL (ref 1–4.8)
LYMPHOCYTES NFR BLD: 17.3 % (ref 18–48)
MCH RBC QN AUTO: 26.4 PG (ref 27–31)
MCHC RBC AUTO-ENTMCNC: 31.2 G/DL (ref 32–36)
MCV RBC AUTO: 85 FL (ref 82–98)
MICROSCOPIC COMMENT: ABNORMAL
MONOCYTES # BLD AUTO: 1.1 K/UL (ref 0.3–1)
MONOCYTES NFR BLD: 6.1 % (ref 4–15)
NEUTROPHILS # BLD AUTO: 13 K/UL (ref 1.8–7.7)
NEUTROPHILS NFR BLD: 75.5 % (ref 38–73)
NITRITE UR QL STRIP: NEGATIVE
NRBC BLD-RTO: 0 /100 WBC
PH UR STRIP: 7 [PH] (ref 5–8)
PLATELET # BLD AUTO: 343 K/UL (ref 150–450)
PMV BLD AUTO: 11.2 FL (ref 9.2–12.9)
POTASSIUM SERPL-SCNC: 3.6 MMOL/L (ref 3.5–5.1)
PROT SERPL-MCNC: 7.2 G/DL (ref 6–8.4)
PROT UR QL STRIP: NEGATIVE
RBC # BLD AUTO: 4.89 M/UL (ref 4–5.4)
RBC #/AREA URNS AUTO: 1 /HPF (ref 0–4)
SODIUM SERPL-SCNC: 139 MMOL/L (ref 136–145)
SP GR UR STRIP: 1.02 (ref 1–1.03)
SQUAMOUS #/AREA URNS AUTO: ABNORMAL /HPF
URN SPEC COLLECT METH UR: ABNORMAL
UROBILINOGEN UR STRIP-ACNC: NEGATIVE EU/DL
UUN UR-MCNC: 9 MG/DL (ref 7–17)
WBC # BLD AUTO: 17.19 K/UL (ref 3.9–12.7)
WBC #/AREA URNS AUTO: 1 /HPF (ref 0–5)

## 2023-05-07 PROCEDURE — 25500020 PHARM REV CODE 255: Mod: ER | Performed by: EMERGENCY MEDICINE

## 2023-05-07 PROCEDURE — 81025 URINE PREGNANCY TEST: CPT | Mod: ER | Performed by: EMERGENCY MEDICINE

## 2023-05-07 PROCEDURE — 85025 COMPLETE CBC W/AUTO DIFF WBC: CPT | Mod: ER | Performed by: EMERGENCY MEDICINE

## 2023-05-07 PROCEDURE — 96360 HYDRATION IV INFUSION INIT: CPT | Mod: ER

## 2023-05-07 PROCEDURE — 96361 HYDRATE IV INFUSION ADD-ON: CPT | Mod: ER

## 2023-05-07 PROCEDURE — 84702 CHORIONIC GONADOTROPIN TEST: CPT | Mod: ER | Performed by: EMERGENCY MEDICINE

## 2023-05-07 PROCEDURE — 80053 COMPREHEN METABOLIC PANEL: CPT | Mod: ER | Performed by: EMERGENCY MEDICINE

## 2023-05-07 PROCEDURE — 99285 EMERGENCY DEPT VISIT HI MDM: CPT | Mod: 25,ER

## 2023-05-07 PROCEDURE — 81000 URINALYSIS NONAUTO W/SCOPE: CPT | Mod: ER | Performed by: EMERGENCY MEDICINE

## 2023-05-07 PROCEDURE — 25000003 PHARM REV CODE 250: Mod: ER | Performed by: EMERGENCY MEDICINE

## 2023-05-07 RX ORDER — PROCHLORPERAZINE MALEATE 10 MG
10 TABLET ORAL EVERY 6 HOURS PRN
Qty: 15 TABLET | Refills: 0 | OUTPATIENT
Start: 2023-05-07 | End: 2023-06-28

## 2023-05-07 RX ADMIN — IOHEXOL 100 ML: 350 INJECTION, SOLUTION INTRAVENOUS at 01:05

## 2023-05-07 RX ADMIN — IOHEXOL 30 ML: 300 INJECTION, SOLUTION INTRAVENOUS at 01:05

## 2023-05-07 RX ADMIN — SODIUM CHLORIDE 1000 ML: 0.9 INJECTION, SOLUTION INTRAVENOUS at 10:05

## 2023-05-07 NOTE — ED PROVIDER NOTES
Encounter Date: 5/7/2023       History     Chief Complaint   Patient presents with    Emesis     Vomiting  and abd cramping for the past 3 weeks and now reports her throat is sore from vomiting. Poor appetite but has gained 28 lbs in 6 weeks.    Last cycle was 10/20/22.      The history is provided by the patient.   Emesis   This is a chronic problem. The current episode started several weeks ago. The problem occurs 2 - 4 times per day. Associated symptoms include arthralgias, chills, diarrhea, a fever and sweats.   Review of patient's allergies indicates:   Allergen Reactions    Sulfa (sulfonamide antibiotics)      Past Medical History:   Diagnosis Date    Polycystic ovarian disease      Past Surgical History:   Procedure Laterality Date    adnoids      TONSILLECTOMY       Family History   Problem Relation Age of Onset    Hypertension Father     Hypertension Mother      Social History     Tobacco Use    Smoking status: Never     Passive exposure: Yes    Smokeless tobacco: Never   Substance Use Topics    Alcohol use: No    Drug use: No     Review of Systems   Constitutional:  Positive for chills and fever.   HENT:  Negative for sore throat.    Respiratory:  Negative for shortness of breath.    Cardiovascular:  Negative for chest pain.   Gastrointestinal:  Positive for diarrhea and vomiting. Negative for nausea.   Genitourinary:  Negative for dysuria.   Musculoskeletal:  Positive for arthralgias. Negative for back pain.   Skin:  Negative for rash.   Neurological:  Negative for weakness.   Hematological:  Does not bruise/bleed easily.     Physical Exam     Initial Vitals [05/07/23 0927]   BP Pulse Resp Temp SpO2   (!) 138/96 (!) 128 20 98.6 °F (37 °C) 100 %      MAP       --         Physical Exam    Constitutional: She appears well-developed and well-nourished. She does not appear ill. No distress.   HENT:   Head: Normocephalic and atraumatic.   Right Ear: Hearing normal. No hemotympanum.   Left Ear:  Hearing normal. No hemotympanum.   Nose: Nose normal. No rhinorrhea.   Mouth/Throat: Uvula is midline and oropharynx is clear and moist. No posterior oropharyngeal edema.   Eyes: Conjunctivae, EOM and lids are normal. Pupils are equal, round, and reactive to light.   Neck: Trachea normal. Neck supple.   Normal range of motion.  Cardiovascular:  Regular rhythm.   Tachycardia present.         Musculoskeletal:      Cervical back: Normal range of motion and neck supple. No spinous process tenderness or muscular tenderness.     Lymphadenopathy:     She has no cervical adenopathy.     She has no axillary adenopathy.   Neurological: She is alert. GCS eye subscore is 4. GCS verbal subscore is 5. GCS motor subscore is 6.   Skin: Skin is warm and dry.   Psychiatric: She has a normal mood and affect. Her speech is normal and behavior is normal.     ED Course   Procedures  Labs Reviewed   URINALYSIS, REFLEX TO URINE CULTURE - Abnormal; Notable for the following components:       Result Value    Appearance, UA Cloudy (*)     All other components within normal limits    Narrative:     Preferred Collection Type->Urine, Clean Catch  Specimen Source->Urine   CBC W/ AUTO DIFFERENTIAL - Abnormal; Notable for the following components:    WBC 17.19 (*)     MCH 26.4 (*)     MCHC 31.2 (*)     RDW 14.6 (*)     Gran # (ANC) 13.0 (*)     Immature Grans (Abs) 0.05 (*)     Mono # 1.1 (*)     Gran % 75.5 (*)     Lymph % 17.3 (*)     All other components within normal limits    Narrative:     Release to patient->Immediate   URINALYSIS MICROSCOPIC - Abnormal; Notable for the following components:    Amorphous, UA Many (*)     All other components within normal limits    Narrative:     Preferred Collection Type->Urine, Clean Catch  Specimen Source->Urine   HCG, QUANTITATIVE    Narrative:     Release to patient->Immediate   COMPREHENSIVE METABOLIC PANEL    Narrative:     Release to patient->Immediate   POCT URINE PREGNANCY          Imaging Results               CT Abdomen Pelvis With Contrast (Final result)  Result time 05/07/23 13:23:40      Final result by Otoniel Vasquez MD (05/07/23 13:23:40)                   Narrative:    EXAMINATION:  CT ABDOMEN PELVIS WITH CONTRAST    CLINICAL HISTORY:  Nausea/vomiting;    TECHNIQUE:  Low dose axial images, sagittal and coronal reformations were obtained from the lung bases to the pubic symphysis following the IV administration of 100 mL of Omnipaque 350 and the oral administration of 30 ml of Omnipaque 350.    All CT scans at this location are performed using dose optimization techniques including the following: Automated exposure control; adjustment of the mA and/or kv; use of iterative reconstruction technique.    COMPARISON:  Liver is normal in size, contour, and enhancement.  No focal lesion.  Portal venous system is patent.    Gallbladder and biliary tree are unremarkable.  No cholelithiasis or ductal dilatation.    Spleen is normal in size and enhancement.  No focal lesion.    Pancreas is normal in size, contour, and enhancement.  No focal lesion or ductal dilatation.    Adrenal glands are unremarkable.    Kidneys are normal in size and enhancement.  No focal lesion, urolithiasis, or hydroureteronephrosis.    Borderline prominent jejunal wall thickening in the left hemiabdomen with small associated mesenteric lymph nodes.  Gastrointestinal tract is otherwise unremarkable.  No evidence of obstruction or mass lesion.  Appendix is normal.    Urinary bladder is unremarkable.    There is a well-circumscribed 1.6 x 2.5 x 2.0 cm cystic structure at the left aspect of the lower uterine segment.  Uterus and adnexal structures are otherwise unremarkable.    Major vessels of the abdomen and pelvis are unremarkable.    No suspicious lymphadenopathy.    No significant ascites, pneumoperitoneum, or peritoneal implant.    No acute bony abnormality.  No aggressive lytic or blastic lesion.    Visualized lower lungs are  clear.    FINDINGS:  Borderline prominent jejunal wall thickening in the left upper quadrant with associated small mesenteric lymph nodes may represent mild enteritis.    Nonspecific well-circumscribed 1.6 x 2.5 x 2.0 cm cystic structure at the left aspect of the lower uterine segment.  Pelvic ultrasound may be considered for better evaluation if clinically indicated.    Otherwise, no acute abdominopelvic abnormality.      Electronically signed by: Otoniel Vasquez  Date:    05/07/2023  Time:    13:23                                  X-Rays:   Independently Interpreted Readings:   Other Readings:  Radiology interpretation of the CT does show some enteritis as well as a uterine cyst  Medications   sodium chloride 0.9% bolus 1,000 mL 1,000 mL (0 mLs Intravenous Stopped 5/7/23 1308)   iohexoL (OMNIPAQUE 350) injection 100 mL (100 mLs Intravenous Given 5/7/23 1306)   iohexoL (OMNIPAQUE 300) injection 30 mL (30 mLs Intravenous Given 5/7/23 1306)     Medical Decision Making:   Differential Diagnosis:   Differential diagnosis includes but is not limited to pregnancy, UTI, gastroenteritis, enteritis  Clinical Tests:   Lab Tests: Ordered and Reviewed  The following lab test(s) were unremarkable: CBC and CMP       <> Summary of Lab: 17,000 leukocyte count  Radiological Study: Ordered and Reviewed  ED Management:  The patient was seen examined.  She was given IV fluids her resting tachycardia stopped.  She was given IV and oral contrast for her CT scan.  Electrolytes and pregnancy test are negative.  There is an incidental uterine says she can get a non obstetrical transvaginal ultrasound as an outpatient.  She can follow up with primary care in the [place].  Symptomatic treatment will be Compazine 10 mg orally.                        Clinical Impression:   Final diagnoses:  [K52.9] Gastroenteritis (Primary)        ED Disposition Condition    Discharge Stable          ED Prescriptions       Medication Sig Dispense Start Date  End Date Auth. Provider    prochlorperazine (COMPAZINE) 10 MG tablet Take 1 tablet (10 mg total) by mouth every 6 (six) hours as needed. 15 tablet 5/7/2023 -- Amor Demarco, DO          Follow-up Information       Follow up With Specialties Details Why Contact Info    [facility]  Schedule an appointment as soon as possible for a visit   [address] [facility]  [address]  [facility] [address]  [phone]               Amor Demarco, DO  05/07/23 1339       Amor Demarco,   06/11/23 0955

## 2023-05-07 NOTE — ED NOTES
Pt completed oral contrast, notified CT tech.  States will bring to CT in approx 30 minutes.  Pt updated. Pt with no c/o n/v.

## 2023-05-07 NOTE — Clinical Note
"Nola "Hernán Rooney was seen and treated in our emergency department on 5/7/2023.  She may return to work on 05/08/2023.  Let this note serve as a work excuse for May 6th and 7th     If you have any questions or concerns, please don't hesitate to call.      Amor Demarco, DO"
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
## 2023-06-28 ENCOUNTER — HOSPITAL ENCOUNTER (EMERGENCY)
Facility: HOSPITAL | Age: 23
Discharge: HOME | End: 2023-06-28
Attending: EMERGENCY MEDICINE
Payer: MEDICAID

## 2023-06-28 VITALS
DIASTOLIC BLOOD PRESSURE: 79 MMHG | SYSTOLIC BLOOD PRESSURE: 118 MMHG | RESPIRATION RATE: 16 BRPM | HEART RATE: 97 BPM | BODY MASS INDEX: 57.52 KG/M2 | TEMPERATURE: 99 F | OXYGEN SATURATION: 97 % | HEIGHT: 60 IN | WEIGHT: 293 LBS

## 2023-06-28 DIAGNOSIS — M25.561 RIGHT KNEE PAIN: ICD-10-CM

## 2023-06-28 LAB
B-HCG UR QL: NEGATIVE
CTP QC/QA: YES

## 2023-06-28 PROCEDURE — 99283 EMERGENCY DEPT VISIT LOW MDM: CPT | Mod: ER

## 2023-06-28 PROCEDURE — 81025 URINE PREGNANCY TEST: CPT | Mod: ER | Performed by: NURSE PRACTITIONER

## 2023-06-28 RX ORDER — SEMAGLUTIDE 0.68 MG/ML
0.5 INJECTION, SOLUTION SUBCUTANEOUS
COMMUNITY

## 2023-06-28 NOTE — Clinical Note
"Nola Irwinjuanjose Rooney was seen and treated in our emergency department on 6/28/2023.  She may return to work on 06/29/2023.       If you have any questions or concerns, please don't hesitate to call.      Nico RICO    "

## 2023-06-28 NOTE — ED PROVIDER NOTES
Encounter Date: 6/28/2023       History     Chief Complaint   Patient presents with    Knee Pain     PT reports right knee swelling and pain that started today. Denies injury     22 y/o morbidly obese female which presents to the ED today with right knee pain. She has had knee pain before and it improved but she has a new job that she is standing for long periods of time. Denies injury or trauma. No other complaints at this time.     The history is provided by the patient.   Review of patient's allergies indicates:   Allergen Reactions    Sulfa (sulfonamide antibiotics)      Past Medical History:   Diagnosis Date    Polycystic ovarian disease      Past Surgical History:   Procedure Laterality Date    adnoids      TONSILLECTOMY       Family History   Problem Relation Age of Onset    Hypertension Father     Hypertension Mother      Social History     Tobacco Use    Smoking status: Never     Passive exposure: Yes    Smokeless tobacco: Never   Substance Use Topics    Alcohol use: No    Drug use: No     Review of Systems   Constitutional:  Negative for fever.   HENT:  Negative for sore throat.    Respiratory:  Negative for shortness of breath.    Cardiovascular:  Negative for chest pain.   Gastrointestinal:  Negative for nausea.   Genitourinary:  Negative for dysuria.   Musculoskeletal:  Positive for arthralgias. Negative for back pain.   Skin:  Negative for rash.   Neurological:  Negative for weakness.   Hematological:  Does not bruise/bleed easily.   All other systems reviewed and are negative.    Physical Exam     Initial Vitals [06/28/23 1235]   BP Pulse Resp Temp SpO2   (!) 126/90 105 18 98.6 °F (37 °C) 97 %      MAP       --         Physical Exam    Nursing note and vitals reviewed.  Constitutional: She appears well-developed and well-nourished.   HENT:   Head: Normocephalic and atraumatic.   Eyes: Conjunctivae and EOM are normal. Pupils are equal, round, and reactive to light.   Neck:   Normal range of  motion.  Cardiovascular:  Normal rate, regular rhythm, normal heart sounds and intact distal pulses.     Exam reveals no gallop and no friction rub.       No murmur heard.  Pulmonary/Chest: Breath sounds normal. No respiratory distress. She has no wheezes. She has no rhonchi. She has no rales. She exhibits no tenderness.   Abdominal: Abdomen is soft. Bowel sounds are normal. She exhibits no distension and no mass. There is no abdominal tenderness. There is no rebound and no guarding.   Musculoskeletal:         General: Tenderness present. No edema. Normal range of motion.      Cervical back: Normal range of motion.      Right knee: No swelling, effusion, erythema or ecchymosis. Normal range of motion. Tenderness present over the patellar tendon. No medial joint line tenderness.      Left knee: Normal.      Comments: Tenderness is only with ROM     Neurological: She is alert and oriented to person, place, and time. She has normal strength. GCS score is 15. GCS eye subscore is 4. GCS verbal subscore is 5. GCS motor subscore is 6.   Skin: Skin is warm. Capillary refill takes less than 2 seconds. No rash noted. No erythema.   Psychiatric: She has a normal mood and affect.       ED Course   Procedures  Labs Reviewed   POCT URINE PREGNANCY          Imaging Results              X-Ray Knee 3 View Right (Final result)  Result time 06/28/23 13:35:15      Final result by HILARY Madrid Sr., MD (06/28/23 13:35:15)                   Impression:      Normal study.      Electronically signed by: Isaac Madrid MD  Date:    06/28/2023  Time:    13:35               Narrative:    EXAMINATION:  XR KNEE 3 VIEW RIGHT    CLINICAL HISTORY:  Pain in right knee    COMPARISON:  None    FINDINGS:  There is no fracture. There is no dislocation.                                       Medications - No data to display  Medical Decision Making:   Initial Assessment:   22 y/o female which presents to the ED with right knee pain. Xray  pending.  Differential Diagnosis:   Obesity, arthritis, joint effusion, knee strain  Clinical Tests:   Lab Tests: Ordered and Reviewed  The following lab test(s) were unremarkable: UPT  Radiological Study: Ordered and Reviewed  ED Management:  Pt examined and has pain with ROM to right knee. Xray negative for joint effusion or fracture. NO signs of septic joint. Pt advised to take ibuprofen or tylenol as needed for pain and to follow up with her PCP if the pain continues. Patient given strict return precautions and voiced understanding of all discharge instructions. Pt was stable at discharge.                  ED Course as of 06/28/23 1410   Wed Jun 28, 2023   1247 BP(!): 126/90 [AT]   1247 Temp: 98.6 °F (37 °C) [AT]   1247 Temp Source: Oral [AT]   1247 Pulse: 105 [AT]   1247 Resp: 18 [AT]   1247 SpO2: 97 % [AT]      ED Course User Index  [AT] SIRENA Bustillo                 Clinical Impression:   Final diagnoses:  [M25.561] Right knee pain        ED Disposition Condition    Discharge Stable          ED Prescriptions    None       Follow-up Information       Follow up With Specialties Details Why Contact Info    primary care provider as needed                 SIRENA Bustillo  06/28/23 1410

## 2023-06-28 NOTE — Clinical Note
"Nola "Hernán Rooney was seen and treated in our emergency department on 6/28/2023.  She may return to work on 06/28/2023.       If you have any questions or concerns, please don't hesitate to call.      SIRENA Bustillo"

## 2023-08-15 ENCOUNTER — HOSPITAL ENCOUNTER (EMERGENCY)
Facility: HOSPITAL | Age: 23
Discharge: HOME | End: 2023-08-15
Attending: EMERGENCY MEDICINE
Payer: MEDICAID

## 2023-08-15 VITALS
HEIGHT: 60 IN | DIASTOLIC BLOOD PRESSURE: 85 MMHG | WEIGHT: 290 LBS | TEMPERATURE: 99 F | BODY MASS INDEX: 56.93 KG/M2 | RESPIRATION RATE: 18 BRPM | HEART RATE: 103 BPM | SYSTOLIC BLOOD PRESSURE: 125 MMHG | OXYGEN SATURATION: 99 %

## 2023-08-15 DIAGNOSIS — R19.7 DIARRHEA, UNSPECIFIED TYPE: ICD-10-CM

## 2023-08-15 DIAGNOSIS — R11.2 NAUSEA AND VOMITING, UNSPECIFIED VOMITING TYPE: Primary | ICD-10-CM

## 2023-08-15 LAB
ALBUMIN SERPL BCP-MCNC: 4.1 G/DL (ref 3.5–5.2)
ALP SERPL-CCNC: 93 U/L (ref 38–126)
ALT SERPL W/O P-5'-P-CCNC: 28 U/L (ref 10–44)
ANION GAP SERPL CALC-SCNC: 12 MMOL/L (ref 8–16)
AST SERPL-CCNC: 24 U/L (ref 15–46)
B-HCG UR QL: NEGATIVE
BASOPHILS # BLD AUTO: 0.03 K/UL (ref 0–0.2)
BASOPHILS NFR BLD: 0.2 % (ref 0–1.9)
BILIRUB SERPL-MCNC: 0.8 MG/DL (ref 0.1–1)
BILIRUB UR QL STRIP: NEGATIVE
CALCIUM SERPL-MCNC: 9.1 MG/DL (ref 8.7–10.5)
CHLORIDE SERPL-SCNC: 104 MMOL/L (ref 95–110)
CLARITY UR REFRACT.AUTO: CLEAR
CO2 SERPL-SCNC: 25 MMOL/L (ref 23–29)
COLOR UR AUTO: YELLOW
CREAT SERPL-MCNC: 0.57 MG/DL (ref 0.5–1.4)
CTP QC/QA: YES
DIFFERENTIAL METHOD: ABNORMAL
EOSINOPHIL # BLD AUTO: 0.1 K/UL (ref 0–0.5)
EOSINOPHIL NFR BLD: 0.7 % (ref 0–8)
ERYTHROCYTE [DISTWIDTH] IN BLOOD BY AUTOMATED COUNT: 13.7 % (ref 11.5–14.5)
EST. GFR  (NO RACE VARIABLE): >60 ML/MIN/1.73 M^2
GLUCOSE SERPL-MCNC: 102 MG/DL (ref 70–110)
GLUCOSE UR QL STRIP: NEGATIVE
HCT VFR BLD AUTO: 41.6 % (ref 37–48.5)
HGB BLD-MCNC: 13.4 G/DL (ref 12–16)
HGB UR QL STRIP: NEGATIVE
IMM GRANULOCYTES # BLD AUTO: 0.04 K/UL (ref 0–0.04)
IMM GRANULOCYTES NFR BLD AUTO: 0.3 % (ref 0–0.5)
KETONES UR QL STRIP: NEGATIVE
LEUKOCYTE ESTERASE UR QL STRIP: NEGATIVE
LIPASE SERPL-CCNC: 42 U/L (ref 23–300)
LYMPHOCYTES # BLD AUTO: 1.9 K/UL (ref 1–4.8)
LYMPHOCYTES NFR BLD: 12 % (ref 18–48)
MCH RBC QN AUTO: 27.8 PG (ref 27–31)
MCHC RBC AUTO-ENTMCNC: 32.2 G/DL (ref 32–36)
MCV RBC AUTO: 86 FL (ref 82–98)
MONOCYTES # BLD AUTO: 0.7 K/UL (ref 0.3–1)
MONOCYTES NFR BLD: 4.5 % (ref 4–15)
NEUTROPHILS # BLD AUTO: 12.9 K/UL (ref 1.8–7.7)
NEUTROPHILS NFR BLD: 82.3 % (ref 38–73)
NITRITE UR QL STRIP: NEGATIVE
NRBC BLD-RTO: 0 /100 WBC
PH UR STRIP: 6 [PH] (ref 5–8)
PLATELET # BLD AUTO: 341 K/UL (ref 150–450)
PMV BLD AUTO: 11.3 FL (ref 9.2–12.9)
POTASSIUM SERPL-SCNC: 4.4 MMOL/L (ref 3.5–5.1)
PROT SERPL-MCNC: 7.5 G/DL (ref 6–8.4)
PROT UR QL STRIP: ABNORMAL
RBC # BLD AUTO: 4.82 M/UL (ref 4–5.4)
SODIUM SERPL-SCNC: 141 MMOL/L (ref 136–145)
SP GR UR STRIP: >=1.03 (ref 1–1.03)
URN SPEC COLLECT METH UR: ABNORMAL
UROBILINOGEN UR STRIP-ACNC: NEGATIVE EU/DL
UUN UR-MCNC: 11 MG/DL (ref 7–17)
WBC # BLD AUTO: 15.62 K/UL (ref 3.9–12.7)

## 2023-08-15 PROCEDURE — 96374 THER/PROPH/DIAG INJ IV PUSH: CPT | Mod: ER

## 2023-08-15 PROCEDURE — 96361 HYDRATE IV INFUSION ADD-ON: CPT | Mod: ER

## 2023-08-15 PROCEDURE — 80053 COMPREHEN METABOLIC PANEL: CPT | Mod: ER | Performed by: PHYSICIAN ASSISTANT

## 2023-08-15 PROCEDURE — 83690 ASSAY OF LIPASE: CPT | Mod: ER | Performed by: PHYSICIAN ASSISTANT

## 2023-08-15 PROCEDURE — 85025 COMPLETE CBC W/AUTO DIFF WBC: CPT | Mod: ER | Performed by: PHYSICIAN ASSISTANT

## 2023-08-15 PROCEDURE — 81025 URINE PREGNANCY TEST: CPT | Mod: ER | Performed by: EMERGENCY MEDICINE

## 2023-08-15 PROCEDURE — 63600175 PHARM REV CODE 636 W HCPCS: Mod: ER | Performed by: PHYSICIAN ASSISTANT

## 2023-08-15 PROCEDURE — 81003 URINALYSIS AUTO W/O SCOPE: CPT | Mod: ER | Performed by: EMERGENCY MEDICINE

## 2023-08-15 PROCEDURE — 25000003 PHARM REV CODE 250: Mod: ER | Performed by: PHYSICIAN ASSISTANT

## 2023-08-15 PROCEDURE — 99284 EMERGENCY DEPT VISIT MOD MDM: CPT | Mod: 25,ER

## 2023-08-15 RX ORDER — ONDANSETRON 4 MG/1
4 TABLET, FILM COATED ORAL EVERY 6 HOURS
Qty: 15 TABLET | Refills: 0 | OUTPATIENT
Start: 2023-08-15 | End: 2024-03-30

## 2023-08-15 RX ORDER — ONDANSETRON 2 MG/ML
4 INJECTION INTRAMUSCULAR; INTRAVENOUS
Status: COMPLETED | OUTPATIENT
Start: 2023-08-15 | End: 2023-08-15

## 2023-08-15 RX ADMIN — SODIUM CHLORIDE 1000 ML: 9 INJECTION, SOLUTION INTRAVENOUS at 07:08

## 2023-08-15 RX ADMIN — ONDANSETRON HYDROCHLORIDE 4 MG: 2 SOLUTION INTRAMUSCULAR; INTRAVENOUS at 07:08

## 2023-08-15 NOTE — FIRST PROVIDER EVALUATION
Emergency Department TeleTriage Encounter Note      CHIEF COMPLAINT    Chief Complaint   Patient presents with    Vomiting     PT states she has been having stomach pain and vomiting since this morning. Pt reports vomiting yellow and green bowel. Pt states she felt fine yesterday. Pt states she not able to hold anything down, including water. PT report diarrhea as well.        VITAL SIGNS   Initial Vitals [08/15/23 1805]   BP Pulse Resp Temp SpO2   128/83 (!) 123 18 99.1 °F (37.3 °C) 99 %      MAP       --            ALLERGIES    Review of patient's allergies indicates:   Allergen Reactions    Sulfa (sulfonamide antibiotics)        PROVIDER TRIAGE NOTE  Patient presents with vomiting and diarrhea that started today. Reports LLQ abdominal pain. Cannot tolerate po.       ORDERS  Labs Reviewed   URINALYSIS, REFLEX TO URINE CULTURE   POCT URINE PREGNANCY       ED Orders (720h ago, onward)      Start Ordered     Status Ordering Provider    08/15/23 1808 08/15/23 1807  Urinalysis, Reflex to Urine Culture Urine, Clean Catch  STAT         In process MICAH AKBAR    08/15/23 1808 08/15/23 1807  POCT urine pregnancy  Once         Final result MICAH AKBAR              Virtual Visit Note: The provider triage portion of this emergency department evaluation and documentation was performed via [employer], a HIPAA-compliant telemedicine application, in concert with a tele-presenter in the room. A face to face patient evaluation with one of my colleagues will occur once the patient is placed in an emergency department room.      DISCLAIMER: This note was prepared with [employer] voice recognition transcription software. Garbled syntax, mangled pronouns, and other bizarre constructions may be attributed to that software system.

## 2023-08-16 NOTE — ED PROVIDER NOTES
Encounter Date: 8/15/2023       History     Chief Complaint   Patient presents with    Vomiting     PT states she has been having stomach pain and vomiting since this morning. Pt reports vomiting yellow and green bowel. Pt states she felt fine yesterday. Pt states she not able to hold anything down, including water. PT report diarrhea as well.      This is a 23-year-old white female with significant past medical history of PCOS and obesity that presents the ED complaining of 1 day of nausea, vomiting, diarrhea, generalized lower abdominal pain.  She states she has not been able to keep any fluids or food down since yesterday evening.  She has taken no medications for her symptoms.  She denies any fever, cold symptoms, chest pain, shortness of breath, dysuria, urgency, frequency, or hematuria.      Review of patient's allergies indicates:   Allergen Reactions    Sulfa (sulfonamide antibiotics)      Past Medical History:   Diagnosis Date    Polycystic ovarian disease      Past Surgical History:   Procedure Laterality Date    adnoids      TONSILLECTOMY       Family History   Problem Relation Age of Onset    Hypertension Father     Hypertension Mother      Social History     Tobacco Use    Smoking status: Never     Passive exposure: Yes    Smokeless tobacco: Never   Substance Use Topics    Alcohol use: No    Drug use: No     Review of Systems   Constitutional:  Positive for appetite change. Negative for fever.   HENT:  Negative for congestion, ear pain, rhinorrhea and sore throat.    Respiratory:  Negative for cough and shortness of breath.    Cardiovascular:  Negative for chest pain and palpitations.   Gastrointestinal:  Positive for abdominal pain, diarrhea, nausea and vomiting.   Psychiatric/Behavioral:  Negative for agitation and behavioral problems.        Physical Exam     Initial Vitals [08/15/23 1805]   BP Pulse Resp Temp SpO2   128/83 (!) 123 18 99.1 °F (37.3 °C) 99 %      MAP       --         Physical  Exam    Constitutional: She appears well-developed and well-nourished.   HENT:   Head: Normocephalic and atraumatic.   Eyes: EOM are normal. Pupils are equal, round, and reactive to light.   Cardiovascular:  Regular rhythm and normal heart sounds.   Tachycardia present.         Pulmonary/Chest: Breath sounds normal. She has no wheezes.   Abdominal: Abdomen is soft. Bowel sounds are normal. There is no abdominal tenderness.     Neurological: She is alert and oriented to person, place, and time.   Psychiatric: She has a normal mood and affect. Thought content normal.         ED Course   Procedures  Labs Reviewed   URINALYSIS, REFLEX TO URINE CULTURE - Abnormal; Notable for the following components:       Result Value    Specific Gravity, UA >=1.030 (*)     Protein, UA Trace (*)     All other components within normal limits    Narrative:     Preferred Collection Type->Urine, Clean Catch  Specimen Source->Urine   CBC W/ AUTO DIFFERENTIAL - Abnormal; Notable for the following components:    WBC 15.62 (*)     Gran # (ANC) 12.9 (*)     Gran % 82.3 (*)     Lymph % 12.0 (*)     All other components within normal limits   COMPREHENSIVE METABOLIC PANEL   LIPASE   POCT URINE PREGNANCY          Imaging Results    None          Medications   sodium chloride 0.9% bolus 1,000 mL 1,000 mL (1,000 mLs Intravenous New Bag 8/15/23 1943)   ondansetron injection 4 mg (4 mg Intravenous Given 8/15/23 1944)     Medical Decision Making:   Initial Assessment:   This is a 23-year-old white female who presents to ED with complaint of acute onset of nausea, vomiting, and diarrhea that began this morning.  Differential Diagnosis:   UTI, viral gastroenteritis, pancreatitis, obstruction  ED Management:  23-year-old white female presents ED with complaint of acute onset of nausea, vomiting, and diarrhea that began this morning.  On arrival the patient is afebrile and nontoxic-appearing but does not appear to feel well.  However, on arrival the  patient was tachycardic with a heart rate of 123.  Please see physical exam for further details.  Most notably, the patient had a completely benign abdominal exam with no tenderness, normoactive bowel sounds, and a soft abdomen.  CBC reveals a leukocytosis of 15.62.  UA, CMP, lipase are all unremarkable.  I do not suspect any emergent or life-threatening intra-abdominal disease process.  I believe this patient likely has a viral gastroenteritis.  I would also attribute her mild leukocytosis to this verses any other emergent etiologies.  The patient was given IV fluids and IV Zofran in the ED in her condition improved.  She passed a p.o. challenge.  She will be written a prescription for Zofran and has been instructed to rest and hydrate slowly.  She is to have close PCP follow-up over the next 2-3 days or return to the ED for worsening.  She verbalized understanding and was agreeable to the treatment plan.                           Clinical Impression:   Final diagnoses:  [R11.2] Nausea and vomiting, unspecified vomiting type (Primary)  [R19.7] Diarrhea, unspecified type        ED Disposition Condition    Discharge Stable          ED Prescriptions       Medication Sig Dispense Start Date End Date Auth. Provider    ondansetron (ZOFRAN) 4 MG tablet Take 1 tablet (4 mg total) by mouth every 6 (six) hours. 15 tablet 8/15/2023 -- Aidan Diaz PA-C          Follow-up Information    None          Aidan Diaz PA-C  08/15/23 2051

## 2023-10-15 ENCOUNTER — HOSPITAL ENCOUNTER (EMERGENCY)
Facility: HOSPITAL | Age: 23
Discharge: HOME | End: 2023-10-15
Attending: EMERGENCY MEDICINE
Payer: MEDICAID

## 2023-10-15 VITALS
WEIGHT: 290 LBS | RESPIRATION RATE: 19 BRPM | DIASTOLIC BLOOD PRESSURE: 84 MMHG | HEIGHT: 61 IN | OXYGEN SATURATION: 99 % | TEMPERATURE: 99 F | SYSTOLIC BLOOD PRESSURE: 138 MMHG | HEART RATE: 105 BPM | BODY MASS INDEX: 54.75 KG/M2

## 2023-10-15 DIAGNOSIS — B37.31 YEAST INFECTION OF THE VAGINA: ICD-10-CM

## 2023-10-15 DIAGNOSIS — N76.0 BACTERIAL VAGINOSIS: Primary | ICD-10-CM

## 2023-10-15 DIAGNOSIS — B96.89 BACTERIAL VAGINOSIS: Primary | ICD-10-CM

## 2023-10-15 LAB
B-HCG UR QL: NEGATIVE
BACTERIA GENITAL QL WET PREP: ABNORMAL
BILIRUB UR QL STRIP: NEGATIVE
CLARITY UR REFRACT.AUTO: CLEAR
CLUE CELLS VAG QL WET PREP: ABNORMAL
COLOR UR AUTO: YELLOW
CTP QC/QA: YES
FILAMENT FUNGI VAG WET PREP-#/AREA: ABNORMAL
GLUCOSE UR QL STRIP: NEGATIVE
HGB UR QL STRIP: NEGATIVE
KETONES UR QL STRIP: NEGATIVE
LEUKOCYTE ESTERASE UR QL STRIP: NEGATIVE
NITRITE UR QL STRIP: NEGATIVE
PH UR STRIP: 6 [PH] (ref 5–8)
POCT GLUCOSE: 92 MG/DL (ref 70–110)
PROT UR QL STRIP: NEGATIVE
SP GR UR STRIP: >=1.03 (ref 1–1.03)
SPECIMEN SOURCE: ABNORMAL
T VAGINALIS GENITAL QL WET PREP: ABNORMAL
URN SPEC COLLECT METH UR: ABNORMAL
UROBILINOGEN UR STRIP-ACNC: NEGATIVE EU/DL
WBC #/AREA VAG WET PREP: ABNORMAL
YEAST GENITAL QL WET PREP: ABNORMAL

## 2023-10-15 PROCEDURE — 81003 URINALYSIS AUTO W/O SCOPE: CPT | Mod: ER | Performed by: EMERGENCY MEDICINE

## 2023-10-15 PROCEDURE — 99283 EMERGENCY DEPT VISIT LOW MDM: CPT | Mod: ER

## 2023-10-15 PROCEDURE — 63700000 PHARM REV CODE 250 ALT 637 W/O HCPCS: Mod: ER | Performed by: NURSE PRACTITIONER

## 2023-10-15 PROCEDURE — 82962 GLUCOSE BLOOD TEST: CPT | Mod: ER

## 2023-10-15 PROCEDURE — 87591 N.GONORRHOEAE DNA AMP PROB: CPT | Mod: ER | Performed by: NURSE PRACTITIONER

## 2023-10-15 PROCEDURE — 25000003 PHARM REV CODE 250: Mod: ER | Performed by: NURSE PRACTITIONER

## 2023-10-15 PROCEDURE — 87210 SMEAR WET MOUNT SALINE/INK: CPT | Mod: ER | Performed by: NURSE PRACTITIONER

## 2023-10-15 PROCEDURE — 81025 URINE PREGNANCY TEST: CPT | Mod: ER | Performed by: EMERGENCY MEDICINE

## 2023-10-15 RX ORDER — FLUCONAZOLE 100 MG/1
200 TABLET ORAL
Status: COMPLETED | OUTPATIENT
Start: 2023-10-15 | End: 2023-10-15

## 2023-10-15 RX ORDER — METRONIDAZOLE 500 MG/1
500 TABLET ORAL EVERY 12 HOURS
Qty: 14 TABLET | Refills: 0 | Status: SHIPPED | OUTPATIENT
Start: 2023-10-15 | End: 2023-10-22

## 2023-10-15 RX ORDER — METRONIDAZOLE 500 MG/1
500 TABLET ORAL
Status: COMPLETED | OUTPATIENT
Start: 2023-10-15 | End: 2023-10-15

## 2023-10-15 RX ADMIN — FLUCONAZOLE 200 MG: 100 TABLET ORAL at 08:10

## 2023-10-15 RX ADMIN — METRONIDAZOLE 500 MG: 500 TABLET ORAL at 08:10

## 2023-10-16 LAB
C TRACH DNA SPEC QL NAA+PROBE: NOT DETECTED
N GONORRHOEA DNA SPEC QL NAA+PROBE: NOT DETECTED

## 2023-10-16 NOTE — ED PROVIDER NOTES
"Encounter Date: 10/15/2023       History     Chief Complaint   Patient presents with    Female  Problem     Pt reports pelvic pain since 10-10-23. Pt states it hurts when she walks and feels like it's "throbbing." Pt states cycle is 11 days late, home pregnancy test were negative. Also reports nausea and vomiting.      24 y/o female which presents with vaginal irritation. She does not have pelvic pain per the patient. The vaginal irritation started 4-5 days ago. She denies a vaginal discharge or urinary symptoms. No other complaints at this time.     The history is provided by the patient.     Review of patient's allergies indicates:   Allergen Reactions    Sulfa (sulfonamide antibiotics)      Past Medical History:   Diagnosis Date    Polycystic ovarian disease      Past Surgical History:   Procedure Laterality Date    adnoids      TONSILLECTOMY       Family History   Problem Relation Age of Onset    Hypertension Father     Hypertension Mother      Social History     Tobacco Use    Smoking status: Never     Passive exposure: Yes    Smokeless tobacco: Never   Substance Use Topics    Alcohol use: No    Drug use: No     Review of Systems   Constitutional:  Negative for fever.   HENT:  Negative for sore throat.    Respiratory:  Negative for shortness of breath.    Cardiovascular:  Negative for chest pain.   Gastrointestinal:  Negative for nausea.   Genitourinary:  Positive for vaginal pain. Negative for dysuria.   Musculoskeletal:  Negative for back pain.   Skin:  Negative for rash.   Neurological:  Negative for weakness.   Hematological:  Does not bruise/bleed easily.   All other systems reviewed and are negative.      Physical Exam     Initial Vitals [10/15/23 1935]   BP Pulse Resp Temp SpO2   138/84 105 19 99.3 °F (37.4 °C) 99 %      MAP       --         Physical Exam    Nursing note and vitals reviewed.  Constitutional: She appears well-developed and well-nourished.   HENT:   Head: Normocephalic and atraumatic. "   Eyes: Conjunctivae and EOM are normal. Pupils are equal, round, and reactive to light.   Neck:   Normal range of motion.  Cardiovascular:  Normal rate, regular rhythm, normal heart sounds and intact distal pulses.     Exam reveals no gallop and no friction rub.       No murmur heard.  Pulmonary/Chest: Breath sounds normal. No respiratory distress. She has no wheezes. She has no rhonchi. She has no rales. She exhibits no tenderness.   Abdominal: Abdomen is soft. Bowel sounds are normal. She exhibits no distension and no mass. There is no abdominal tenderness. There is no rebound and no guarding.   Genitourinary: There is tenderness on the right labia. There is no rash or lesion on the right labia. There is tenderness on the left labia. There is no rash or lesion on the left labia. Right adnexum displays no mass and no tenderness. Left adnexum displays no mass and no tenderness.    Vaginal discharge and tenderness present.      No vaginal erythema or bleeding.   There is tenderness in the vagina. No erythema or bleeding in the vagina.    No foreign body in the vagina.      Genitourinary Comments: White discharge with a foul odor consistent with bacterial vaginosis     Musculoskeletal:         General: No tenderness or edema. Normal range of motion.      Cervical back: Normal range of motion.     Neurological: She is alert and oriented to person, place, and time. She has normal strength. GCS score is 15. GCS eye subscore is 4. GCS verbal subscore is 5. GCS motor subscore is 6.   Skin: Skin is warm. Capillary refill takes less than 2 seconds. No rash noted. No erythema.   Psychiatric: She has a normal mood and affect.       ED Course   Procedures  Labs Reviewed   VAGINAL SCREEN - Abnormal; Notable for the following components:       Result Value    Bacteria - Vaginal Screen Few (*)     All other components within normal limits    Narrative:     Specimen Source->Vagina  Release to patient->Immediate   URINALYSIS,  REFLEX TO URINE CULTURE - Abnormal; Notable for the following components:    Specific Gravity, UA >=1.030 (*)     All other components within normal limits    Narrative:     Preferred Collection Type->Urine, Clean Catch  Specimen Source->Urine   C. TRACHOMATIS/N. GONORRHOEAE BY AMP DNA   POCT URINE PREGNANCY   POCT GLUCOSE          Imaging Results    None          Medications   metroNIDAZOLE tablet 500 mg (500 mg Oral Given 10/15/23 2028)   fluconazole tablet 200 mg (200 mg Oral Given 10/15/23 2028)     Medical Decision Making  24 y/o female which presents to the ED with vaginal irritation and odor for the past few days. Exam revealed white discharge concerning for bacterial vaginosis and yeast. Pt given a diflucan and discharged with Flagyl. GC/Chlamydia pending. Patient given strict return precautions and voiced understanding of all discharge instructions. Pt was stable at discharge.           Problems Addressed:  Bacterial vaginosis: acute illness or injury  Yeast infection of the vagina: acute illness or injury    Amount and/or Complexity of Data Reviewed  Labs: ordered. Decision-making details documented in ED Course.    Risk  Prescription drug management.               ED Course as of 10/16/23 1427   Sun Oct 15, 2023   1939 BP: 138/84 [AT]   1939 Temp: 99.3 °F (37.4 °C) [AT]   1939 Pulse: 105 [AT]   1939 Resp: 19 [AT]   1939 SpO2: 99 % [AT]   1943 Preg Test, Ur: Negative [AT]   1954 POCT Glucose: 92 [AT]      ED Course User Index  [AT] Deysi Dalton FNP                    Clinical Impression:   Final diagnoses:  [N76.0, B96.89] Bacterial vaginosis (Primary)  [B37.31] Yeast infection of the vagina        ED Disposition Condition    Discharge Stable          ED Prescriptions       Medication Sig Dispense Start Date End Date Auth. Provider    metroNIDAZOLE (FLAGYL) 500 MG tablet Take 1 tablet (500 mg total) by mouth every 12 (twelve) hours. for 7 days 14 tablet 10/15/2023 10/22/2023 Deysi Dalton FNP           Follow-up Information       Follow up With Specialties Details Why Contact Info    primary care provider as needed                 Deysi Dalton, SIRENA  10/16/23 9092

## 2023-11-17 DIAGNOSIS — Z71.89 COUNSELING ON SUBSTANCE USE AND ABUSE: Primary | ICD-10-CM

## 2023-11-20 ENCOUNTER — HOSPITAL ENCOUNTER (OUTPATIENT)
Dept: RADIOLOGY | Facility: HOSPITAL | Age: 23
Discharge: HOME | End: 2023-11-20
Attending: PHYSICIAN ASSISTANT
Payer: MEDICAID

## 2023-11-20 ENCOUNTER — HOSPITAL ENCOUNTER (OUTPATIENT)
Dept: CARDIOLOGY | Facility: HOSPITAL | Age: 23
Discharge: HOME | End: 2023-11-20
Attending: PHYSICIAN ASSISTANT
Payer: MEDICAID

## 2023-11-20 DIAGNOSIS — Z71.89 COUNSELING ON SUBSTANCE USE AND ABUSE: ICD-10-CM

## 2023-11-20 PROCEDURE — 93005 ELECTROCARDIOGRAM TRACING: CPT | Mod: PN

## 2023-11-20 PROCEDURE — 71046 X-RAY EXAM CHEST 2 VIEWS: CPT | Mod: TC,FY,PN

## 2023-11-20 PROCEDURE — 93010 EKG 12-LEAD: ICD-10-PCS | Mod: ,,, | Performed by: INTERNAL MEDICINE

## 2023-11-20 PROCEDURE — 71046 XR CHEST PA AND LATERAL: ICD-10-PCS | Mod: 26,,, | Performed by: RADIOLOGY

## 2023-11-20 PROCEDURE — 71046 X-RAY EXAM CHEST 2 VIEWS: CPT | Mod: 26,,, | Performed by: RADIOLOGY

## 2023-11-20 PROCEDURE — 93010 ELECTROCARDIOGRAM REPORT: CPT | Mod: ,,, | Performed by: INTERNAL MEDICINE

## 2024-03-04 ENCOUNTER — HOSPITAL ENCOUNTER (EMERGENCY)
Facility: HOSPITAL | Age: 24
Discharge: HOME | End: 2024-03-04
Attending: EMERGENCY MEDICINE
Payer: MEDICAID

## 2024-03-04 VITALS
BODY MASS INDEX: 56.93 KG/M2 | RESPIRATION RATE: 20 BRPM | HEART RATE: 104 BPM | DIASTOLIC BLOOD PRESSURE: 80 MMHG | WEIGHT: 290 LBS | SYSTOLIC BLOOD PRESSURE: 168 MMHG | OXYGEN SATURATION: 100 % | HEIGHT: 60 IN | TEMPERATURE: 98 F

## 2024-03-04 DIAGNOSIS — S90.31XA CONTUSION OF RIGHT FOOT, INITIAL ENCOUNTER: Primary | ICD-10-CM

## 2024-03-04 DIAGNOSIS — S99.921A FOOT INJURY, RIGHT, INITIAL ENCOUNTER: ICD-10-CM

## 2024-03-04 PROCEDURE — 99283 EMERGENCY DEPT VISIT LOW MDM: CPT | Mod: 25,ER

## 2024-03-04 NOTE — Clinical Note
"Nola Benitez"Toribio was seen and treated in our emergency department on 3/4/2024.  She may return to work on 03/05/2024.       If you have any questions or concerns, please don't hesitate to call.       RN    "

## 2024-03-04 NOTE — ED PROVIDER NOTES
Encounter Date: 3/4/2024       History     Chief Complaint   Patient presents with    Foot Injury     Patient reports hitting her right foot on a dog cage today and has pain to the 5th digit .      23-year-old female presenting with right foot/right 5th toe pain after hitting it on a dog cage.  Patient says she has pain to the mid foot and difficulty moving her 5th toe.      Review of patient's allergies indicates:   Allergen Reactions    Sulfa (sulfonamide antibiotics)      Past Medical History:   Diagnosis Date    Polycystic ovarian disease      Past Surgical History:   Procedure Laterality Date    adnoids      TONSILLECTOMY       Family History   Problem Relation Age of Onset    Hypertension Father     Hypertension Mother      Social History     Tobacco Use    Smoking status: Never     Passive exposure: Yes    Smokeless tobacco: Never   Substance Use Topics    Alcohol use: No    Drug use: No     Review of Systems   Constitutional:  Negative for fever.   Musculoskeletal:  Positive for arthralgias (Right foot pain).   Skin:  Negative for wound.       Physical Exam     Initial Vitals [03/04/24 1518]   BP Pulse Resp Temp SpO2   (!) 168/80 104 20 98.3 °F (36.8 °C) 100 %      MAP       --         Physical Exam    Nursing note and vitals reviewed.  HENT:   Head: Atraumatic.   Musculoskeletal:      Comments: Tenderness to palpation near base of right 5th toe.  DP pulse intact.  No open wounds     Neurological: She is alert and oriented to person, place, and time.   Skin: Skin is warm and dry.         ED Course   Procedures  Labs Reviewed - No data to display       Imaging Results              X-Ray Foot Complete Right (Final result)  Result time 03/04/24 15:41:53      Final result by Aleksandr Mckenzie MD (03/04/24 15:41:53)                   Impression:      No acute fracture or dislocation.      Electronically signed by: Aleksandr Mckenzie MD  Date:    03/04/2024  Time:    15:41               Narrative:    EXAMINATION:  XR  FOOT COMPLETE 3 VIEW RIGHT    CLINICAL HISTORY:  XR FOOT COMPLETE 3 VIEW RIGHTUnspecified injury of right foot, initial encounter    COMPARISON:  None    FINDINGS:  Three views of the  were obtained.    No evidence of acute fracture or dislocation.  Mild periarticular osteopenia.  Soft tissues are unremarkable.                                       Medications - No data to display  Medical Decision Making  23-year-old female presenting with [id number] toe/right foot injury after hitting it on dog cage.  Neurovascularly intact.  Plain films ordered    Amount and/or Complexity of Data Reviewed  Radiology: ordered.               ED Course as of 03/04/24 1606   Mon Mar 04, 2024   1606 X-ray shows no evidence of fracture dislocation.  Findings consistent with contusion.  Plan for symptomatic treatment and primary care follow-up as needed [SN]      ED Course User Index  [SN] Sina Pagan MD                           Clinical Impression:  Final diagnoses:  [S99.921A] Foot injury, right, initial encounter  [S90.31XA] Contusion of right foot, initial encounter (Primary)          ED Disposition Condition    Discharge Stable          ED Prescriptions    None       Follow-up Information       Follow up With Specialties Details Why Contact Info    Primary care  Schedule an appointment as soon as possible for a visit  As needed              Sina Pagan MD  03/04/24 1606

## 2024-03-30 ENCOUNTER — HOSPITAL ENCOUNTER (EMERGENCY)
Facility: HOSPITAL | Age: 24
Discharge: HOME | End: 2024-03-30
Attending: EMERGENCY MEDICINE
Payer: MEDICAID

## 2024-03-30 VITALS
OXYGEN SATURATION: 99 % | HEART RATE: 99 BPM | SYSTOLIC BLOOD PRESSURE: 140 MMHG | DIASTOLIC BLOOD PRESSURE: 88 MMHG | WEIGHT: 293 LBS | HEIGHT: 60 IN | BODY MASS INDEX: 57.52 KG/M2 | TEMPERATURE: 98 F | RESPIRATION RATE: 20 BRPM

## 2024-03-30 DIAGNOSIS — N92.3 INTERMENSTRUAL BLEEDING: Primary | ICD-10-CM

## 2024-03-30 LAB
B-HCG UR QL: NEGATIVE
CTP QC/QA: YES

## 2024-03-30 PROCEDURE — 81025 URINE PREGNANCY TEST: CPT | Mod: ER

## 2024-03-30 PROCEDURE — 99284 EMERGENCY DEPT VISIT MOD MDM: CPT | Mod: ER

## 2024-03-30 PROCEDURE — 25000003 PHARM REV CODE 250: Mod: ER

## 2024-03-30 RX ORDER — NAPROXEN 500 MG/1
500 TABLET ORAL 2 TIMES DAILY WITH MEALS
Qty: 30 TABLET | Refills: 0 | Status: SHIPPED | OUTPATIENT
Start: 2024-03-30

## 2024-03-30 RX ORDER — ONDANSETRON 4 MG/1
4 TABLET, ORALLY DISINTEGRATING ORAL
Status: COMPLETED | OUTPATIENT
Start: 2024-03-30 | End: 2024-03-30

## 2024-03-30 RX ORDER — ONDANSETRON 4 MG/1
4 TABLET, FILM COATED ORAL EVERY 6 HOURS
Qty: 12 TABLET | Refills: 0 | Status: SHIPPED | OUTPATIENT
Start: 2024-03-30

## 2024-03-30 RX ADMIN — ONDANSETRON 4 MG: 4 TABLET, ORALLY DISINTEGRATING ORAL at 09:03

## 2024-03-31 NOTE — ED PROVIDER NOTES
Encounter Date: 3/30/2024       History     Chief Complaint   Patient presents with    Vaginal Bleeding     Reports to ED c c/o vaginal bleeding that x 1 week. States last night passed 3 blood clots and has been having stomach pain since. Unsure if was pregnant     Nola Rooney is a 24 y.o. female who has a past medical history of Polycystic ovarian disease. presenting to the Emergency Department for vaginal bleeding. She reports LMP March 10-12th.  She had intercourse on the 14th and began spotting on the 25th. For the last 5 days she has intermittent spotting. She reports it is never heavy, but did have two large blood clots that passed last night. She is struggling with nausea for the last two days. Today some intermittent lower abdominal pain that feels sharp. Tylenol does help. Denies fever. Frequent headaches. Denies dyrusia, hematuria. Recently started on cyclic provera two months ago.          The history is provided by the patient.     Review of patient's allergies indicates:   Allergen Reactions    Sulfa (sulfonamide antibiotics)      Past Medical History:   Diagnosis Date    Polycystic ovarian disease      Past Surgical History:   Procedure Laterality Date    adnoids      TONSILLECTOMY       Family History   Problem Relation Age of Onset    Hypertension Father     Hypertension Mother      Social History     Tobacco Use    Smoking status: Never     Passive exposure: Yes    Smokeless tobacco: Never   Substance Use Topics    Alcohol use: No    Drug use: No     Review of Systems   Constitutional:  Negative for chills and fever.   Gastrointestinal:  Positive for abdominal pain (none now). Negative for blood in stool, constipation, diarrhea, nausea and vomiting.   Genitourinary:  Positive for vaginal bleeding. Negative for dysuria, hematuria and vaginal discharge.   Neurological:  Positive for headaches.   Psychiatric/Behavioral:  Negative for agitation, behavioral problems and confusion.        Physical  Exam     Initial Vitals [03/30/24 2022]   BP Pulse Resp Temp SpO2   (!) 141/100 (!) 116 19 98.7 °F (37.1 °C) 97 %      MAP       --         Physical Exam    Nursing note and vitals reviewed.  Constitutional: She appears well-developed and well-nourished. She is not diaphoretic.  Non-toxic appearance. No distress.   HENT:   Head: Normocephalic and atraumatic.   Right Ear: Hearing and external ear normal.   Left Ear: Hearing and external ear normal.   Eyes: EOM are normal.   Neck: Neck supple.   Normal range of motion.  Cardiovascular:  Regular rhythm.   Tachycardia present.         Pulmonary/Chest: Breath sounds normal. No respiratory distress. She has no wheezes.   Abdominal: Abdomen is soft. She exhibits no distension. There is no abdominal tenderness. There is no rebound.   Musculoskeletal:         General: Normal range of motion.      Cervical back: Normal range of motion and neck supple. Normal range of motion.     Neurological: She is alert and oriented to person, place, and time. GCS score is 15. GCS eye subscore is 4. GCS verbal subscore is 5. GCS motor subscore is 6.   Skin: Skin is warm and dry.   Psychiatric: She has a normal mood and affect. Her behavior is normal. Judgment and thought content normal.         ED Course   Procedures  Labs Reviewed   POCT URINE PREGNANCY          Imaging Results    None          Medications   ondansetron disintegrating tablet 4 mg (4 mg Oral Given 3/30/24 2114)     Medical Decision Making  25 yo female with history PCOS and AUB presents with vaginal bleeding, nausea, lower abdominal cramping. Full HPI as above. She denies any abdominal pain at this time. Abdomen is soft-nontender. UPT is negative. History not concerning for hemorrhage, anemia. Presentation not consistent with torsion, ectopic pregnancy to require emergent US. Will treat nausea with zofran.     Differential Diagnosis includes, but is not limited to:  Pregnancy complication (threatened AB, inevitable AB,  incomplete Ab, missed AB, uterine rupture, ectopic pregnancy, placental abruption, placenta previa), ovarian cyst/torsion, STD, foreign body, trauma, normal menstruation.    Nausea resolved with zofran. Patient is without complaint at this time. Rx for zofran sent to pharmacy. Naproxen for pain. Follow up with OBGYN. ED return precautions discussed with patient for heavy bleeding, s/s of anemia, severe pain, intractable N/V, etc. Patient agreeable. All questions answered. Discussed dispo with my attending Dr. Manzanares      Amount and/or Complexity of Data Reviewed  Labs: ordered. Decision-making details documented in ED Course.    Risk  Prescription drug management.               ED Course as of 04/01/24 0907   Sat Mar 30, 2024   2050 hCG Qualitative, Urine: Negative [CS]      ED Course User Index  [CS] Rosie Byrne PA-C                           Clinical Impression:  Final diagnoses:  [N92.3] Intermenstrual bleeding (Primary)          ED Disposition Condition    Discharge Stable          ED Prescriptions       Medication Sig Dispense Start Date End Date Auth. Provider    ondansetron (ZOFRAN) 4 MG tablet Take 1 tablet (4 mg total) by mouth every 6 (six) hours. 12 tablet 3/30/2024 -- Rosie Byrne PA-C    naproxen (NAPROSYN) 500 MG tablet Take 1 tablet (500 mg total) by mouth 2 (two) times daily with meals. 30 tablet 3/30/2024 -- Rosie Byrne PA-C          Follow-up Information       Follow up With Specialties Details Why Contact Info    Briana Stephens MD Obstetrics and Gynecology Schedule an appointment as soon as possible for a visit   [id number] [place] [address]  [phone]               Rosie Byrne PA-C  04/01/24 0907

## 2024-03-31 NOTE — DISCHARGE INSTRUCTIONS
It was nice meeting you, and I hope you feel better soon. You may return to the ER at any time for any new or concerning symptoms, worsening condition, or failure to improve.    Our goal in the ER is to always give you outstanding care and exceptional service. You may receive a survey by mail or email in the next week about your experience in our ED. We would greatly appreciate you completing and returning the survey. Your feedback provides us with a way to recognize our staff who give very good care and it helps us learn how to improve when your experience was below our aspiration of excellence.     Sincerely,     Rosie Byrne PA-C  Emergency Room Physician Assistant  [facility]

## 2024-03-31 NOTE — ED NOTES
Review of patient's allergies indicates:   Allergen Reactions    Sulfa (sulfonamide antibiotics)         Patient has verified the spelling of the name and  on armband.   APPEARANCE: Patient is alert, calm, oriented x 4, and does not appear distressed.  SKIN: Skin is normal for race, warm, and dry. Normal skin turgor and mucous membranes moist.  CARDIAC: Normal rate and rhythm, no murmur heard.   RESPIRATORY:Normal rate and effort. Breath sounds clear bilaterally throughout chest. Respirations are equal and unlabored.    GASTRO: Bowel sounds normal, abdomen is soft, no tenderness, and no abdominal distention. Patient now c/o that she has been vomiting and having abdominal pain for the last couple days.   MUSCLE: Full ROM. No bony tenderness or soft tissue tenderness. No obvious deformity.  PERIPHERAL VASCULAR: peripheral pulses present. Normal cap refill. No edema. Warm to touch.  NEURO: Sue coma scale: eyes open spontaneously-4, oriented & converses-5, obeys commands-6. No neurological abnormalities.   MENTAL STATUS: awake, alert and aware of environment.  EYE: No overt deficits noted. No drainage. Sclera WNL  ENT: EARS: no obvious drainage. NOSE: no active bleeding. THROAT: no redness or swelling.  BREAST: symmetrical. No masses. No tenderness.  GENITALIA: Normal external genitalia. Reports occasional vaginal bleeding with a clot noted once  : Voids without complication

## 2024-06-15 ENCOUNTER — HOSPITAL ENCOUNTER (EMERGENCY)
Facility: HOSPITAL | Age: 24
Discharge: HOME | End: 2024-06-15
Attending: EMERGENCY MEDICINE
Payer: MEDICAID

## 2024-06-15 VITALS
BODY MASS INDEX: 53.01 KG/M2 | DIASTOLIC BLOOD PRESSURE: 76 MMHG | TEMPERATURE: 98 F | WEIGHT: 270 LBS | HEIGHT: 60 IN | HEART RATE: 88 BPM | SYSTOLIC BLOOD PRESSURE: 122 MMHG | OXYGEN SATURATION: 99 % | RESPIRATION RATE: 17 BRPM

## 2024-06-15 DIAGNOSIS — S63.502A LEFT WRIST SPRAIN, INITIAL ENCOUNTER: Primary | ICD-10-CM

## 2024-06-15 DIAGNOSIS — M25.532 LEFT WRIST PAIN: ICD-10-CM

## 2024-06-15 LAB
B-HCG UR QL: NEGATIVE
CTP QC/QA: YES

## 2024-06-15 PROCEDURE — 25000003 PHARM REV CODE 250: Mod: ER | Performed by: EMERGENCY MEDICINE

## 2024-06-15 PROCEDURE — 81025 URINE PREGNANCY TEST: CPT | Mod: ER | Performed by: EMERGENCY MEDICINE

## 2024-06-15 PROCEDURE — 99283 EMERGENCY DEPT VISIT LOW MDM: CPT | Mod: 25,ER

## 2024-06-15 RX ORDER — DICLOFENAC SODIUM 75 MG/1
75 TABLET, DELAYED RELEASE ORAL 2 TIMES DAILY
Qty: 60 TABLET | Refills: 0 | Status: SHIPPED | OUTPATIENT
Start: 2024-06-15

## 2024-06-15 RX ORDER — ACETAMINOPHEN 500 MG
1000 TABLET ORAL
Status: COMPLETED | OUTPATIENT
Start: 2024-06-15 | End: 2024-06-15

## 2024-06-15 RX ADMIN — ACETAMINOPHEN 1000 MG: 500 TABLET ORAL at 06:06

## 2024-06-15 NOTE — ED PROVIDER NOTES
Encounter Date: 6/15/2024       History     Chief Complaint   Patient presents with    Wrist Injury     Pt rpt trip and fall last night and heard a pop. Pt states she has swelling and color change to her left wrist. No deformities noted.      HPI  24 y.o.   RHD c/o L foosh last night, co wrist pain, no other injuries  Worse with movement  Mod  Nr    Review of patient's allergies indicates:   Allergen Reactions    Sulfa (sulfonamide antibiotics)      Past Medical History:   Diagnosis Date    Polycystic ovarian disease      Past Surgical History:   Procedure Laterality Date    adnoids      TONSILLECTOMY       Family History   Problem Relation Name Age of Onset    Hypertension Father      Hypertension Mother       Social History     Tobacco Use    Smoking status: Never     Passive exposure: Yes    Smokeless tobacco: Never   Substance Use Topics    Alcohol use: No    Drug use: No     Review of Systems  All systems were reviewed/examined and were negative except as noted in the HPI.    Physical Exam     Initial Vitals [06/15/24 1816]   BP Pulse Resp Temp SpO2   136/78 98 18 98.5 °F (36.9 °C) 99 %      MAP       --         Physical Exam    General: the patient is awake, alert, and in no apparent distress.  Head: normocephalic and atraumatic, sclera are clear  Neck: supple without meningismus  Chest: no respiratory distress  Heart: regular rate and rhythm  L wrist mild tenderness, no obvious deformity; hand nt and nvi,  Rest of LUE wnl  Extremities: warm and well perfused  Skin: warm and dry  Psych conversant  Neuro: awake, alert, moving all extremities    ED Course   Procedures  Labs Reviewed   POCT URINE PREGNANCY          Imaging Results              X-Ray Wrist Complete Left (Final result)  Result time 06/15/24 19:43:30      Final result by Niki Craig MD (06/15/24 19:43:30)                   Impression:      No acute finding      Electronically signed by: Niki Craig  Date:    06/15/2024  Time:    19:43                Narrative:    EXAMINATION:  XR WRIST COMPLETE 3 VIEWS LEFT    CLINICAL HISTORY:  Pain in left wrist    TECHNIQUE:  PA, lateral, and oblique views of the left wrist were performed.    COMPARISON:  None    FINDINGS:  No acute fracture or dislocation.  No acute or aggressive bony finding.                                       Medications   acetaminophen tablet 1,000 mg (1,000 mg Oral Given 6/15/24 1835)     Medical Decision Making  Amount and/or Complexity of Data Reviewed  Labs: ordered.  Radiology: ordered.    Risk  OTC drugs.  Prescription drug management.                     Medical Decision Making:    This is an emergent evaluation of a patient presenting to the ED.  Nursing notes were reviewed.  Wrist xr neg  I reviewed radiology images personally along with interpretations.  I personally reviewed and interpreted the laboratory results.  Hcg neg    I decided to obtain and review old medical records, which showed: outpatient and ED visits    Evaluation for Emergency Medical Condition  The patient received a medical screening exam and within a reasonable degree of clinical confidence an emergency medical condition has not been identified.  The patient is instructed on proper follow up and return precautions to the ED.      David Wei MD, LESLIE                   Clinical Impression:  Final diagnoses:  [M25.532] Left wrist pain  [S63.502A] Left wrist sprain, initial encounter (Primary)          ED Disposition Condition    Discharge Stable          ED Prescriptions       Medication Sig Dispense Start Date End Date Auth. Provider    diclofenac (VOLTAREN) 75 MG EC tablet Take 1 tablet (75 mg total) by mouth 2 (two) times daily. 60 tablet 6/15/2024 -- Abran Wei MD          Follow-up Information       Follow up With Specialties Details Why Contact Info Additional Information    [facility] Schedule an appointment as soon as possible for a visit   [address] [place] [address]  [place], [address]  [facility] [address] [phone] Please park in [address] or D and use [address]. Take Medical Office Bldg. elevators.          Discharged to home in stable condition, return to ED warnings given, follow up and patient care instructions given.      David Wei MD, LESLIE, [facility]  Department of Emergency Medicine       Abran Wei MD  06/16/24 6762

## 2024-06-28 ENCOUNTER — HOSPITAL ENCOUNTER (EMERGENCY)
Facility: HOSPITAL | Age: 24
Discharge: HOME | End: 2024-06-28
Attending: FAMILY MEDICINE
Payer: MEDICAID

## 2024-06-28 VITALS
HEIGHT: 60 IN | RESPIRATION RATE: 16 BRPM | DIASTOLIC BLOOD PRESSURE: 96 MMHG | BODY MASS INDEX: 57.52 KG/M2 | OXYGEN SATURATION: 98 % | TEMPERATURE: 98 F | SYSTOLIC BLOOD PRESSURE: 136 MMHG | HEART RATE: 93 BPM | WEIGHT: 293 LBS

## 2024-06-28 DIAGNOSIS — S46.812A TRAPEZIUS STRAIN, LEFT, INITIAL ENCOUNTER: ICD-10-CM

## 2024-06-28 DIAGNOSIS — M54.9 ACUTE LEFT-SIDED BACK PAIN, UNSPECIFIED BACK LOCATION: Primary | ICD-10-CM

## 2024-06-28 LAB
B-HCG UR QL: NEGATIVE
CTP QC/QA: YES

## 2024-06-28 PROCEDURE — 81025 URINE PREGNANCY TEST: CPT | Mod: ER | Performed by: PHYSICIAN ASSISTANT

## 2024-06-28 PROCEDURE — 99284 EMERGENCY DEPT VISIT MOD MDM: CPT | Mod: ER

## 2024-06-28 RX ORDER — METHOCARBAMOL 500 MG/1
500 TABLET, FILM COATED ORAL 4 TIMES DAILY
Qty: 40 TABLET | Refills: 0 | Status: SHIPPED | OUTPATIENT
Start: 2024-06-28 | End: 2024-07-08

## 2024-06-28 RX ORDER — KETOROLAC TROMETHAMINE 10 MG/1
10 TABLET, FILM COATED ORAL EVERY 6 HOURS
Qty: 20 TABLET | Refills: 0 | Status: SHIPPED | OUTPATIENT
Start: 2024-06-28 | End: 2024-07-03

## 2024-06-28 NOTE — Clinical Note
"Nola Rooney (Crysten) was seen and treated in our emergency department on 6/28/2024.  She may return with limitations on 07/01/2024.  The patient should not be doing any heavy lifting or lifting anything heavier in 10 lb next week.  She should not be asked to do extensive bending or twisting.     Sincerely,      Aidan Diaz PA-C    "

## 2024-06-28 NOTE — ED PROVIDER NOTES
"Encounter Date: 6/28/2024       History     Chief Complaint   Patient presents with    Back Pain     Lower back pain x 3 days, states she hasn't been able to sleep      This is a 24-year-old white female with significant past medical history of PCOS, spina bifida occulta, and scoliosis that presents the ED with complaint of left-sided upper back pain for the last 3 days.  She states she was at work and had her arms outstretched on a freezer door and then somebody attempted to open it very hard and very quickly which has led to her pain beginning.  She was unable to quantify her pain but describes it as "sometimes dull, and sometimes sharp".  The pain does not radiate.  She has tried Tylenol and ibuprofen with no relief.  She states she was also been using heated home which only briefly helps.  She denies any pain in her neck or along her spine.      Review of patient's allergies indicates:   Allergen Reactions    Sulfa (sulfonamide antibiotics)      Past Medical History:   Diagnosis Date    Polycystic ovarian disease      Past Surgical History:   Procedure Laterality Date    adnoids      TONSILLECTOMY       Family History   Problem Relation Name Age of Onset    Hypertension Father      Hypertension Mother       Social History     Tobacco Use    Smoking status: Never     Passive exposure: Yes    Smokeless tobacco: Never   Substance Use Topics    Alcohol use: No    Drug use: No     Review of Systems   Constitutional:  Negative for fever.   Respiratory:  Negative for cough and shortness of breath.    Cardiovascular:  Negative for chest pain and palpitations.   Gastrointestinal:  Negative for diarrhea, nausea and vomiting.   Musculoskeletal:  Positive for back pain. Negative for neck pain.   Psychiatric/Behavioral:  The patient is nervous/anxious.        Physical Exam     Initial Vitals [06/28/24 1801]   BP Pulse Resp Temp SpO2   (!) 136/96 93 16 98 °F (36.7 °C) 98 %      MAP       --         Physical " Exam    Constitutional: She appears well-developed and well-nourished.   HENT:   Head: Normocephalic and atraumatic.   Neck: There are no signs of injury. No crepitus.   Cardiovascular:  Normal rate, regular rhythm and normal heart sounds.           Pulmonary/Chest: Breath sounds normal. She has no wheezes.   Musculoskeletal:      Cervical back: Tenderness present. No swelling, edema, deformity, erythema, signs of trauma, lacerations, rigidity, spasms, torticollis, bony tenderness or crepitus. No pain with movement. Normal range of motion.        Back:       Comments: There is tenderness to palpation noted to the central portion of the left trapezius.  There is no midline or bony tenderness of the cervical or thoracic spine.  The patient has full range of motion of her neck, shoulder, and left upper extremity without difficulty and only minor pain.  Motor and sensory intact.  Distal pulses intact.     Neurological: She is alert and oriented to person, place, and time.   Psychiatric: She has a normal mood and affect. Thought content normal.         ED Course   Procedures  Labs Reviewed   POCT URINE PREGNANCY          Imaging Results    None          Medications - No data to display  Medical Decision Making  This is a 24-year-old white female that presents the ED with complaint of left-sided upper back pain for the last 3 days after suffering an injury while at work.  On arrival the patient is afebrile and nontoxic-appearing with stable vital signs.  Differential diagnoses include but are not limited to: Trapezius strain, cervical strain, muscle spasm, fracture, subluxation.  Please see physical exam for further details.  The patient has no midline bony tenderness so I do not believe any imaging is warranted or necessary.  UPT is negative.  I believe the patient has suffered a left trapezius strain with likely muscle spasm.  I do not suspect any emergent or life-threatening musculoskeletal disease process or  condition.  I do not suspect meningitis.  The patient was discharged home with a prescription for Robaxin and Toradol.  She was also to continue applying heat to the area.  Work note was provided.  She should have PCP follow up in the next 2-3 days or return to the ED for worsening.  She verbalized understanding and was agreeable to the treatment plan.    Amount and/or Complexity of Data Reviewed  Labs: ordered.                                      Clinical Impression:  Final diagnoses:  [M54.9] Acute left-sided back pain, unspecified back location (Primary)  [S46.812A] Trapezius strain, left, initial encounter          ED Disposition Condition    Discharge Stable          ED Prescriptions       Medication Sig Dispense Start Date End Date Auth. Provider    ketorolac (TORADOL) 10 mg tablet Take 1 tablet (10 mg total) by mouth every 6 (six) hours. for 5 days 20 tablet 6/28/2024 7/3/2024 Aidan Diaz PA-C    methocarbamoL (ROBAXIN) 500 MG Tab Take 1 tablet (500 mg total) by mouth 4 (four) times daily. for 10 days 40 tablet 6/28/2024 7/8/2024 Aidan Diaz PA-C          Follow-up Information       Follow up With Specialties Details Why Contact [facility] Emergency Dept Emergency Medicine  If symptoms worsen [address] [facility] [address] [phone]    Your Doctor  Schedule an appointment as soon as possible for a visit in 2 days               Aidan Diaz PA-C  06/28/24 0571

## 2024-09-01 ENCOUNTER — HOSPITAL ENCOUNTER (EMERGENCY)
Facility: HOSPITAL | Age: 24
Discharge: HOME | End: 2024-09-01
Attending: EMERGENCY MEDICINE
Payer: MEDICAID

## 2024-09-01 VITALS
HEIGHT: 60 IN | SYSTOLIC BLOOD PRESSURE: 153 MMHG | DIASTOLIC BLOOD PRESSURE: 97 MMHG | RESPIRATION RATE: 18 BRPM | WEIGHT: 290 LBS | TEMPERATURE: 99 F | OXYGEN SATURATION: 100 % | HEART RATE: 109 BPM | BODY MASS INDEX: 56.93 KG/M2

## 2024-09-01 DIAGNOSIS — S93.402A MODERATE LEFT ANKLE SPRAIN, INITIAL ENCOUNTER: Primary | ICD-10-CM
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
## 2024-09-01 LAB
B-HCG UR QL: NEGATIVE
CTP QC/QA: YES

## 2024-09-01 PROCEDURE — 25000003 PHARM REV CODE 250: Performed by: EMERGENCY MEDICINE

## 2024-09-01 PROCEDURE — 81025 URINE PREGNANCY TEST: CPT | Performed by: EMERGENCY MEDICINE

## 2024-09-01 PROCEDURE — 99283 EMERGENCY DEPT VISIT LOW MDM: CPT | Mod: 25

## 2024-09-01 RX ORDER — PHENTERMINE HYDROCHLORIDE 37.5 MG/1
1 TABLET ORAL EVERY MORNING
COMMUNITY
Start: 2024-08-15

## 2024-09-01 RX ORDER — MEDROXYPROGESTERONE ACETATE 10 MG/1
10 TABLET ORAL
COMMUNITY

## 2024-09-01 RX ORDER — HYDROCODONE BITARTRATE AND ACETAMINOPHEN 5; 325 MG/1; MG/1
1 TABLET ORAL
Status: COMPLETED | OUTPATIENT
Start: 2024-09-01 | End: 2024-09-01

## 2024-09-01 RX ORDER — ERGOCALCIFEROL 1.25 MG/1
50000 CAPSULE ORAL
COMMUNITY

## 2024-09-01 RX ORDER — IBUPROFEN 600 MG/1
600 TABLET ORAL
Status: COMPLETED | OUTPATIENT
Start: 2024-09-01 | End: 2024-09-01

## 2024-09-01 RX ORDER — HYDROCODONE BITARTRATE AND ACETAMINOPHEN 5; 325 MG/1; MG/1
1 TABLET ORAL EVERY 6 HOURS PRN
Qty: 8 TABLET | Refills: 0 | Status: SHIPPED | OUTPATIENT
Start: 2024-09-01 | End: 2024-09-04

## 2024-09-01 RX ADMIN — IBUPROFEN 600 MG: 600 TABLET, FILM COATED ORAL at 05:09

## 2024-09-01 RX ADMIN — HYDROCODONE BITARTRATE AND ACETAMINOPHEN 1 TABLET: 5; 325 TABLET ORAL at 05:09

## 2024-09-01 NOTE — Clinical Note
"Nola "Hernán Rooney was seen and treated in our emergency department on 9/1/2024.  She may return to work on 09/08/2024.       If you have any questions or concerns, please don't hesitate to call.      Nikia Chacko MD"

## 2024-09-01 NOTE — ED TRIAGE NOTES
Pt comes in reporting that while walking down the stirs she missed the last step and fall. She's that she felt something snap and she ankle swelled up three times its normal size. Pt also reports that her pain went from a 10 to a 0 and is now an 8. Unable to bear weight per ems. No obvious deformity. No swelling noted.  Left foot cold to touch dorsal pedalis pulse 1+.  AAO x4.

## 2024-09-01 NOTE — ED PROVIDER NOTES
Encounter Date: 9/1/2024       History     Chief Complaint   Patient presents with    Fall     Fall from last step on porch tonight. States pain to left ankle. Dorsal pedis pulse palpable and even, able to wiggle toes. Denies hitting head and loss of consciousness.     HPI    Pleasant 24-year-old female presents the ER for evaluation on of left ankle pain and swelling after fall.  Patient reports that she fell from a step on the porch last night, struck her ankle twisted it causing significant pain and swelling.  No head to head or loss of consciousness she came to the ER for further evaluation.    Review of patient's allergies indicates:   Allergen Reactions    Sulfa (sulfonamide antibiotics) Hives     Past Medical History:   Diagnosis Date    Polycystic ovarian disease      Past Surgical History:   Procedure Laterality Date    adnoids      TONSILLECTOMY       Family History   Problem Relation Name Age of Onset    Hypertension Father      Hypertension Mother       Social History     Tobacco Use    Smoking status: Never     Passive exposure: Yes    Smokeless tobacco: Never   Substance Use Topics    Alcohol use: No    Drug use: No     Review of Systems   Musculoskeletal:  Positive for arthralgias and myalgias.   All other systems reviewed and are negative.      Physical Exam     Initial Vitals [09/01/24 0359]   BP Pulse Resp Temp SpO2   (!) 153/97 109 17 99.2 °F (37.3 °C) 100 %      MAP       --         Physical Exam    Nursing note and vitals reviewed.  Constitutional: She appears well-developed and well-nourished. She appears distressed (appears uncomfortable from symptoms).   Eyes: Pupils are equal, round, and reactive to light.   Neck:   Normal range of motion.  Pulmonary/Chest: No respiratory distress.   Musculoskeletal:      Cervical back: Normal range of motion.      Comments: Moderate left lateral ankle swelling exquisite tenderness to palpation no neurovascular compromise no tenderness proximally up the leg      Neurological: She is alert and oriented to person, place, and time. She has normal strength. GCS score is 15. GCS eye subscore is 4. GCS verbal subscore is 5. GCS motor subscore is 6.   Skin: Skin is warm and dry. Capillary refill takes less than 2 seconds.   Psychiatric: She has a normal mood and affect. Thought content normal.         ED Course   Procedures  Labs Reviewed   POCT URINE PREGNANCY - Normal       Result Value    POC Preg Test, Ur Negative       Acceptable Yes            Imaging Results              X-Ray Ankle Complete Left (In process)                      X-Ray Foot Complete Left (In process)                      Medications   HYDROcodone-acetaminophen 5-325 mg per tablet 1 tablet (1 tablet Oral Given 9/1/24 0510)   ibuprofen tablet 600 mg (600 mg Oral Given 9/1/24 0510)     Medical Decision Making  Pleasant 24-year-old female presents the ER for evaluation of left ankle pain and discomfort after fall.  Patient fell down a step twisted her ankle.  She was moderate left ankle swelling pain and discomfort.  No neurovascular compromise.  Differential includes sprain contusion fracture.  Will plan x-ray imaging pain control reassess.    Amount and/or Complexity of Data Reviewed  Labs: ordered. Decision-making details documented in ED Course.  Radiology: ordered and independent interpretation performed. Decision-making details documented in ED Course.    Risk  Prescription drug management.               ED Course as of 09/02/24 0528   Sun Sep 01, 2024   0541 X-Ray Foot Complete Left [SE]   0541 X-Ray Ankle Complete Left  X-rays obtained and personally reviewed.  Moderate soft tissue ankle swelling noted no obvious bony deformity.  Will wait for official radiology read.  Discussed with patient.  Will plan discharge with crutches and walking boot.  We discussed symptomatic support include ice Tylenol Motrin.  Will give short course of brain medicine for breakthrough pain if needed [SE]    0635 No acute fracture per final radiology read on both plain radiographs. Pt stable for discharge.  [LC]      ED Course User Index  [LC] Ricki Rome MD  [SE] Nikia Chacko MD                           Clinical Impression:  Final diagnoses:  [W19.XXXA] Fall  [S93.402A] Moderate left ankle sprain, initial encounter (Primary)          ED Disposition Condition    Discharge Stable          ED Prescriptions       Medication Sig Dispense Start Date End Date Auth. Provider    HYDROcodone-acetaminophen (NORCO) 5-325 mg per tablet Take 1 tablet by mouth every 6 (six) hours as needed for Pain. 8 tablet 9/1/2024 9/4/2024 Nikia Chacko MD          Follow-up Information       Follow up With Specialties Details Why Contact Info Additional Information    [facility] Schedule an appointment as soon as possible for a visit  As needed [address]  [address]  [facility] [address] [phone] Please park in [address] C or D and use [address] entrance. Take Medical Office Bldg. elevators.             Nikia Chacko MD  09/01/24 0557       Nikia Chacko MD  09/02/24 0528

## 2024-09-01 NOTE — DISCHARGE INSTRUCTIONS
Thank you for coming in to see us at [facility] It was nice to meet you, and I hope you feel better soon. Please feel free to return to the ER at any time should your symptoms get worse, or if you have different emergent concerns.    Our goal in the emergency department is to always give you outstanding care and exceptional service. You may receive a survey by mail or e-mail in the next week regarding your experience in our ED. We would greatly appreciate your completing and returning the survey. Your feedback provides us with a way to recognize our staff who give very good care and it helps us learn how to improve when your experience was below our aspiration of excellence.      It is important to remember that some problems or medical conditions are difficult to diagnose and may not be found or addressed during your Emergency Department visit.  These conditions often start with non-specific symptoms and can only be diagnosed on follow up visits with your primary care physician or specialist when the symptoms continue or change. Please remember that all medical conditions can change, and we cannot predict how you will be feeling tomorrow or the next day.    Return to the ER with any questions/concerns, new/concerning symptoms, worsening, failure to improve or inability to obtain follow-up.       Be sure to follow up with your primary care doctor and review all labs/imaging/tests that were performed during your ER visit with them. It is very common for us to identify non-emergent incidental findings which must be followed up with your primary care physician.  Some labs/imaging/tests may be outside of the normal range, and require non-emergent follow-up and/or further investigation/treatment/procedures/testing to help diagnose/exclude/prevent complications or other potentially serious medical conditions. Some abnormalities may not have been discussed or addressed during your ER visit. Some lab  results may not return during your ER visit but can be accessible by downloading the free [facility] sanchez or by visiting [email or web address] . It is important for you to review all labs/imaging/tests which are outside of the normal range with your physician.    An ER visit does not replace a primary care visit, and many screening tests or follow-up tests cannot be ordered by an ER doctor or performed by the ER. Some tests may even require pre-approval.    If you do not have a primary care doctor, you may contact the one listed on your discharge paperwork or you may also call the [facility] Appointment Desk at [phone] , or [employer] at  [phone] to schedule an appointment, or establish care with a primary care doctor or even a specialist and to obtain information about local resources. It is important to your health that you have a primary care doctor.    Please take all medications as directed. We have done our best to select a medication for you that will treat your condition however, all medications may potentially have side-effects and it is impossible to predict which medications may give you side-effects or what those side-effects (if any) those medications may give you.  If you feel that you are having a negative effect or side-effect of any medication you should stop taking those medications immediately and seek medical attention. If you feel that you are having a life-threatening reaction call 911.        Do not drive, swim, climb to height, take a bath, operate heavy machinery, drink alcohol or take potentially sedating medications, sign any legal documents or make any important decisions for 24 hours if you have received any pain medications, sedatives or mood altering drugs during your ER visit or within 24 hours of taking them if they have been prescribed to you.     You can find additional resources for Dentists, hearing aids, durable medical equipment, low cost pharmacies and  other resources at [email or web address]

## 2024-09-26 ENCOUNTER — HOSPITAL ENCOUNTER (EMERGENCY)
Facility: HOSPITAL | Age: 24
Discharge: HOME | End: 2024-09-27
Attending: STUDENT IN AN ORGANIZED HEALTH CARE EDUCATION/TRAINING PROGRAM
Payer: MEDICAID

## 2024-09-26 DIAGNOSIS — Z87.42 HISTORY OF PCOS: ICD-10-CM

## 2024-09-26 DIAGNOSIS — R10.2 PELVIC PAIN: ICD-10-CM

## 2024-09-26 DIAGNOSIS — R11.2 NAUSEA AND VOMITING, UNSPECIFIED VOMITING TYPE: Primary | ICD-10-CM

## 2024-09-26 LAB
ALBUMIN SERPL BCP-MCNC: 4.4 G/DL (ref 3.5–5.2)
ALP SERPL-CCNC: 92 U/L (ref 38–126)
ALT SERPL W/O P-5'-P-CCNC: 51 U/L (ref 10–44)
ANION GAP SERPL CALC-SCNC: 9 MMOL/L (ref 8–16)
AST SERPL-CCNC: 48 U/L (ref 15–46)
B-HCG UR QL: NEGATIVE
BACTERIA #/AREA URNS AUTO: ABNORMAL /HPF
BASOPHILS # BLD AUTO: 0.04 K/UL (ref 0–0.2)
BASOPHILS NFR BLD: 0.4 % (ref 0–1.9)
BILIRUB SERPL-MCNC: 0.4 MG/DL (ref 0.1–1)
BILIRUB UR QL STRIP: NEGATIVE
CALCIUM SERPL-MCNC: 9 MG/DL (ref 8.7–10.5)
CHLORIDE SERPL-SCNC: 107 MMOL/L (ref 95–110)
CLARITY UR REFRACT.AUTO: ABNORMAL
CO2 SERPL-SCNC: 24 MMOL/L (ref 23–29)
COLOR UR AUTO: YELLOW
CREAT SERPL-MCNC: 0.47 MG/DL (ref 0.5–1.4)
CRP SERPL-MCNC: 3.35 MG/DL (ref 0–1)
CTP QC/QA: YES
DIFFERENTIAL METHOD BLD: NORMAL
EOSINOPHIL # BLD AUTO: 0.1 K/UL (ref 0–0.5)
EOSINOPHIL NFR BLD: 1.4 % (ref 0–8)
ERYTHROCYTE [DISTWIDTH] IN BLOOD BY AUTOMATED COUNT: 13.8 % (ref 11.5–14.5)
EST. GFR  (NO RACE VARIABLE): >60 ML/MIN/1.73 M^2
GLUCOSE SERPL-MCNC: 94 MG/DL (ref 70–110)
GLUCOSE UR QL STRIP: NEGATIVE
HCT VFR BLD AUTO: 41.9 % (ref 37–48.5)
HGB BLD-MCNC: 13.4 G/DL (ref 12–16)
HGB UR QL STRIP: ABNORMAL
HYALINE CASTS UR QL AUTO: 0 /LPF
IMM GRANULOCYTES # BLD AUTO: 0.03 K/UL (ref 0–0.04)
IMM GRANULOCYTES NFR BLD AUTO: 0.3 % (ref 0–0.5)
KETONES UR QL STRIP: NEGATIVE
LEUKOCYTE ESTERASE UR QL STRIP: NEGATIVE
LIPASE SERPL-CCNC: 43 U/L (ref 23–300)
LYMPHOCYTES # BLD AUTO: 2.8 K/UL (ref 1–4.8)
LYMPHOCYTES NFR BLD: 29 % (ref 18–48)
MCH RBC QN AUTO: 28 PG (ref 27–31)
MCHC RBC AUTO-ENTMCNC: 32 G/DL (ref 32–36)
MCV RBC AUTO: 88 FL (ref 82–98)
MICROSCOPIC COMMENT: ABNORMAL
MONOCYTES # BLD AUTO: 0.5 K/UL (ref 0.3–1)
MONOCYTES NFR BLD: 5.6 % (ref 4–15)
NEUTROPHILS # BLD AUTO: 6.1 K/UL (ref 1.8–7.7)
NEUTROPHILS NFR BLD: 63.3 % (ref 38–73)
NITRITE UR QL STRIP: NEGATIVE
NRBC BLD-RTO: 0 /100 WBC
PH UR STRIP: 6 [PH] (ref 5–8)
PLATELET # BLD AUTO: 337 K/UL (ref 150–450)
PMV BLD AUTO: 11.2 FL (ref 9.2–12.9)
POTASSIUM SERPL-SCNC: 3.6 MMOL/L (ref 3.5–5.1)
PROT SERPL-MCNC: 7.3 G/DL (ref 6–8.4)
PROT UR QL STRIP: ABNORMAL
RBC # BLD AUTO: 4.78 M/UL (ref 4–5.4)
RBC #/AREA URNS AUTO: 0 /HPF (ref 0–4)
SODIUM SERPL-SCNC: 140 MMOL/L (ref 136–145)
SP GR UR STRIP: >=1.03 (ref 1–1.03)
SQUAMOUS #/AREA URNS AUTO: 10 /HPF
URN SPEC COLLECT METH UR: ABNORMAL
UROBILINOGEN UR STRIP-ACNC: NEGATIVE EU/DL
UUN UR-MCNC: 9 MG/DL (ref 7–17)
WBC # BLD AUTO: 9.71 K/UL (ref 3.9–12.7)
WBC #/AREA URNS AUTO: 3 /HPF (ref 0–5)

## 2024-09-26 PROCEDURE — 86140 C-REACTIVE PROTEIN: CPT | Mod: ER | Performed by: STUDENT IN AN ORGANIZED HEALTH CARE EDUCATION/TRAINING PROGRAM

## 2024-09-26 PROCEDURE — 96375 TX/PRO/DX INJ NEW DRUG ADDON: CPT | Mod: ER

## 2024-09-26 PROCEDURE — 63600175 PHARM REV CODE 636 W HCPCS: Mod: ER | Performed by: STUDENT IN AN ORGANIZED HEALTH CARE EDUCATION/TRAINING PROGRAM

## 2024-09-26 PROCEDURE — 80053 COMPREHEN METABOLIC PANEL: CPT | Mod: ER | Performed by: STUDENT IN AN ORGANIZED HEALTH CARE EDUCATION/TRAINING PROGRAM

## 2024-09-26 PROCEDURE — 96374 THER/PROPH/DIAG INJ IV PUSH: CPT | Mod: ER

## 2024-09-26 PROCEDURE — 83690 ASSAY OF LIPASE: CPT | Mod: ER | Performed by: STUDENT IN AN ORGANIZED HEALTH CARE EDUCATION/TRAINING PROGRAM

## 2024-09-26 PROCEDURE — 25000003 PHARM REV CODE 250: Mod: ER | Performed by: STUDENT IN AN ORGANIZED HEALTH CARE EDUCATION/TRAINING PROGRAM

## 2024-09-26 PROCEDURE — 99285 EMERGENCY DEPT VISIT HI MDM: CPT | Mod: 25,ER

## 2024-09-26 PROCEDURE — 81000 URINALYSIS NONAUTO W/SCOPE: CPT | Mod: ER | Performed by: STUDENT IN AN ORGANIZED HEALTH CARE EDUCATION/TRAINING PROGRAM

## 2024-09-26 PROCEDURE — 96361 HYDRATE IV INFUSION ADD-ON: CPT | Mod: ER

## 2024-09-26 PROCEDURE — 85025 COMPLETE CBC W/AUTO DIFF WBC: CPT | Mod: ER | Performed by: STUDENT IN AN ORGANIZED HEALTH CARE EDUCATION/TRAINING PROGRAM

## 2024-09-26 PROCEDURE — 81025 URINE PREGNANCY TEST: CPT | Mod: ER | Performed by: STUDENT IN AN ORGANIZED HEALTH CARE EDUCATION/TRAINING PROGRAM

## 2024-09-26 RX ORDER — SODIUM CHLORIDE 9 MG/ML
1000 INJECTION, SOLUTION INTRAVENOUS
Status: COMPLETED | OUTPATIENT
Start: 2024-09-26 | End: 2024-09-27

## 2024-09-26 RX ORDER — ONDANSETRON HYDROCHLORIDE 2 MG/ML
8 INJECTION, SOLUTION INTRAVENOUS ONCE
Status: COMPLETED | OUTPATIENT
Start: 2024-09-26 | End: 2024-09-26

## 2024-09-26 RX ORDER — FAMOTIDINE 10 MG/ML
40 INJECTION INTRAVENOUS ONCE
Status: COMPLETED | OUTPATIENT
Start: 2024-09-26 | End: 2024-09-26

## 2024-09-26 RX ADMIN — SODIUM CHLORIDE 1000 ML: 9 INJECTION, SOLUTION INTRAVENOUS at 10:09

## 2024-09-26 RX ADMIN — ONDANSETRON 8 MG: 2 INJECTION INTRAMUSCULAR; INTRAVENOUS at 10:09

## 2024-09-26 RX ADMIN — FAMOTIDINE 40 MG: 10 INJECTION, SOLUTION INTRAVENOUS at 10:09

## 2024-09-27 VITALS
OXYGEN SATURATION: 100 % | RESPIRATION RATE: 18 BRPM | TEMPERATURE: 99 F | DIASTOLIC BLOOD PRESSURE: 81 MMHG | SYSTOLIC BLOOD PRESSURE: 131 MMHG | BODY MASS INDEX: 56.64 KG/M2 | WEIGHT: 290 LBS | HEART RATE: 101 BPM

## 2024-09-27 PROCEDURE — 96361 HYDRATE IV INFUSION ADD-ON: CPT | Mod: ER

## 2024-09-27 RX ORDER — ONDANSETRON 4 MG/1
8 TABLET, ORALLY DISINTEGRATING ORAL EVERY 8 HOURS PRN
Qty: 20 TABLET | Refills: 0 | Status: SHIPPED | OUTPATIENT
Start: 2024-09-27 | End: 2024-10-01

## 2024-09-27 NOTE — DISCHARGE INSTRUCTIONS
Follow-up with your OBGYN at your scheduled upcoming appointment to discuss better management of your irregular menstrual cycles.  Continue taking regular home medications , use medications as prescribed, and return to the emergency department if condition worsens in any way.

## 2024-09-27 NOTE — ED PROVIDER NOTES
Encounter Date: 9/26/2024       History     Chief Complaint   Patient presents with    Vomiting     Reports to ED c c/o generalized abd cramping. +N/V/D. Hx of PCOS. States intermittent menstrual cycle this past week     HPI  24-year-old female history of PCOS, no other significant medical history presents brought in by self through triage for 1 day of vomiting and several days of lower abdominal cramping, associated with heavy but variable blood product expulsion.  Denies fever, or any other systemic or infectious symptoms or other acute complaints.  Denies diarrhea  Review of patient's allergies indicates:   Allergen Reactions    Sulfa (sulfonamide antibiotics) Hives     Past Medical History:   Diagnosis Date    Polycystic ovarian disease      Past Surgical History:   Procedure Laterality Date    adnoids      TONSILLECTOMY       Family History   Problem Relation Name Age of Onset    Hypertension Father      Hypertension Mother       Social History     Tobacco Use    Smoking status: Never     Passive exposure: Yes    Smokeless tobacco: Never   Substance Use Topics    Alcohol use: No    Drug use: No     Review of Systems  Medical surgical family and social history reviewed  Complete review of systems was conducted and was negative except as per HPI and as marked  Physical Exam     Initial Vitals [09/26/24 2141]   BP Pulse Resp Temp SpO2   134/85 109 19 98.6 °F (37 °C) 99 %      MAP       --         Physical Exam  Physical:  General: Awake, alert, no acute distress  Head: Normocephalic, atraumatic  Neck: Trachea midline, full range of motion, no meningismus  ENT: Atraumatic, Airway Patent  Cardio: Regular Rate, Regular Rhythm, skin perfusion normal  Chest: Atraumatic, No respiratory distress no use of accessory muscles to breath, normal rate  Abd - scant lower pelvic tenderness without peritonitis, negative psoas.  No involuntary guarding, rigidity, or rebound.  Upper Ext: Atraumatic, Inspection normal, no  swelling  Lower Ext: Atraumatic, Inspection normal, no swelling  Neuro: No gross cranial nerve abnormality, no lateralization, no gross sensory or motor deficits  ED Course   Procedures  Labs Reviewed   COMPREHENSIVE METABOLIC PANEL - Abnormal       Result Value    Sodium 140      Potassium 3.6      Chloride 107      CO2 24      Glucose 94      BUN 9      Creatinine 0.47 (*)     Calcium 9.0      Total Protein 7.3      Albumin 4.4      Total Bilirubin 0.4      Alkaline Phosphatase 92      AST 48 (*)     ALT 51 (*)     Anion Gap 9      eGFR >60.0     URINALYSIS, REFLEX TO URINE CULTURE - Abnormal    Specimen UA Urine, Clean Catch      Color, UA Yellow      Appearance, UA Hazy (*)     pH, UA 6.0      Specific Gravity, UA >=1.030 (*)     Protein, UA 1+ (*)     Glucose, UA Negative      Ketones, UA Negative      Bilirubin (UA) Negative      Occult Blood UA 3+ (*)     Nitrite, UA Negative      Urobilinogen, UA Negative      Leukocytes, UA Negative      Narrative:     Preferred Collection Type->Urine, Clean Catch  Specimen Source->Urine   C-REACTIVE PROTEIN - Abnormal    CRP 3.35 (*)    URINALYSIS MICROSCOPIC - Abnormal    RBC, UA 0      WBC, UA 3      Bacteria Few (*)     Squam Epithel, UA 10      Hyaline Casts, UA 0      Microscopic Comment SEE COMMENT      Narrative:     Preferred Collection Type->Urine, Clean Catch  Specimen Source->Urine   CBC W/ AUTO DIFFERENTIAL    WBC 9.71      RBC 4.78      Hemoglobin 13.4      Hematocrit 41.9      MCV 88      MCH 28.0      MCHC 32.0      RDW 13.8      Platelets 337      MPV 11.2      Immature Granulocytes 0.3      Gran # (ANC) 6.1      Immature Grans (Abs) 0.03      Lymph # 2.8      Mono # 0.5      Eos # 0.1      Baso # 0.04      nRBC 0      Gran % 63.3      Lymph % 29.0      Mono % 5.6      Eosinophil % 1.4      Basophil % 0.4      Differential Method Automated     LIPASE    Lipase Result 43     POCT URINE PREGNANCY    POC Preg Test, Ur Negative       Acceptable  Yes            Imaging Results              US Pelvis Comp with Transvag NON-OB (xpd) (Final result)  Result time 09/27/24 00:44:47   Procedure changed from US Transvaginal Non OB     Final result by Billy Raygoza MD (09/27/24 00:44:47)                   Impression:      No acute findings.      Electronically signed by: Billy Raygoza  Date:    09/27/2024  Time:    00:44               Narrative:    EXAMINATION:  US PELVIS COMP WITH TRANSVAG NON-OB (XPD)    CLINICAL HISTORY:  pain; Pelvic and perineal pain    TECHNIQUE:  Transabdominal sonography of the pelvis was performed, followed by transvaginal sonography to better evaluate the uterus and ovaries.    COMPARISON:  None.    FINDINGS:  Uterus:    Size: 8.4 x 3.4 x 3.8 cm    Masses: None    Endometrium: Normal in this pre menopausal patient, measuring 7 mm.    Right ovary:    Size: 3.0 x 2.5 x 3.0 cm    Appearance: Normal    Vascular flow: Normal.    Left ovary:    Size: 3.9 x 2.5 x 2.6 cm    Appearance: Normal    Vascular Flow: Normal.    Free Fluid:    None.                                       Medications   ondansetron injection 8 mg (8 mg Intravenous Given 9/26/24 2235)   0.9%  NaCl infusion (0 mLs Intravenous Stopped 9/27/24 0140)   famotidine (PF) injection 40 mg (40 mg Intravenous Given 9/26/24 2240)     Medical Decision Making  Amount and/or Complexity of Data Reviewed  Labs: ordered.  Radiology: ordered.    Risk  Prescription drug management.                24-year-old female with history of PSC OS presents for lower abdominal cramping which he states is atypical of and beyond her typical menses.  She is hemodynamically stable in no acute distress.  Will evaluate for acute pathology requiring intervention with appropriate studies, treat symptomatically, and reassess.    All studies reviewed, negative for acute pathology requiring intervention.  Ultrasound was done to rule out ovarian torsion, this is negative for cysts or torsion.  Her other  labs are inconsistent with any acute invasive process such as cholecystitis, or appendicitis.  Her exam is likewise inconsistent with this.  After meds she feels much better and wants to go home.  Diagnosis, use of prescription medications, need for follow-up regarding visit today, need to call for follow-up, expected course, and return precautions were all discussed at length in my traditional manner to which patient verbalized understanding and agrees and all questions were answered. Patient is well appearing and ambulatory at time of discharge.                      Clinical Impression:  Final diagnoses:  [R10.2] Pelvic pain  [R10.2] Pelvic pain - r/o ovarian torsion  [R11.2] Nausea and vomiting, unspecified vomiting type (Primary)  [Z87.42] History of PCOS          ED Disposition Condition    Discharge Stable          ED Prescriptions       Medication Sig Dispense Start Date End Date Auth. Provider    ondansetron (ZOFRAN-ODT) 4 MG TbDL Take 2 tablets (8 mg total) by mouth every 8 (eight) hours as needed. 20 tablet 9/27/2024 10/1/2024 Alex Shaw MD          Follow-up Information    None          Alex Sahw MD  09/27/24 0208

## 2024-10-14 ENCOUNTER — HOSPITAL ENCOUNTER (EMERGENCY)
Facility: HOSPITAL | Age: 24
Discharge: HOME | End: 2024-10-14
Attending: EMERGENCY MEDICINE
Payer: MEDICAID

## 2024-10-14 VITALS
DIASTOLIC BLOOD PRESSURE: 84 MMHG | SYSTOLIC BLOOD PRESSURE: 137 MMHG | TEMPERATURE: 98 F | HEIGHT: 60 IN | BODY MASS INDEX: 56.64 KG/M2 | RESPIRATION RATE: 18 BRPM | OXYGEN SATURATION: 98 % | HEART RATE: 96 BPM

## 2024-10-14 DIAGNOSIS — G56.01 CARPAL TUNNEL SYNDROME OF RIGHT WRIST: Primary | ICD-10-CM

## 2024-10-14 DIAGNOSIS — M79.641 RIGHT HAND PAIN: ICD-10-CM

## 2024-10-14 PROCEDURE — 99282 EMERGENCY DEPT VISIT SF MDM: CPT | Mod: ER,25

## 2024-10-14 PROCEDURE — 29125 APPL SHORT ARM SPLINT STATIC: CPT | Mod: RT,ER

## 2024-10-14 NOTE — FIRST PROVIDER EVALUATION
Emergency Department TeleTriage Encounter Note      CHIEF COMPLAINT    Chief Complaint   Patient presents with    Hand Pain     PT reports numbness to first 3 fingers that started Friday. PT reports pain to palm of hand that radiates to fingers that started last night       VITAL SIGNS   Initial Vitals [10/14/24 0949]   BP Pulse Resp Temp SpO2   137/84 96 18 98.4 °F (36.9 °C) 98 %      MAP       --            ALLERGIES    Review of patient's allergies indicates:   Allergen Reactions    Sulfa (sulfonamide antibiotics) Hives       PROVIDER TRIAGE NOTE  Patient presents with numbness in the middle finger - thumb of the right hand and pain in the hand and wrist. She does a lot of repetitive hand movement at work. No other trauma.       ORDERS  Labs Reviewed - No data to display    ED Orders (720h ago, onward)      None              Virtual Visit Note: The provider triage portion of this emergency department evaluation and documentation was performed via [employer], a HIPAA-compliant telemedicine application, in concert with a tele-presenter in the room. A face to face patient evaluation with one of my colleagues will occur once the patient is placed in an emergency department room.      DISCLAIMER: This note was prepared with [employer] voice recognition transcription software. Garbled syntax, mangled pronouns, and other bizarre constructions may be attributed to that software system.

## 2024-10-14 NOTE — DISCHARGE INSTRUCTIONS
Ms. Rooney,     Thank you for letting me care for you today! It was nice meeting you, and I hope you feel better soon.   If you would like access to your chart and what was done today please utilize the [facility].   Please don't hesitate to return if your symptoms worsen or you develop any other worrisome symptoms.    Our goal in the emergency department is to always give you outstanding care and exceptional service. You may receive a survey by mail or e-mail in the next week regarding your experience in our ED. We would greatly appreciate you completing and returning the survey. Your feedback provides us with a way to recognize our staff who give very good care and it helps us learn how to improve when your experience was below our aspiration of excellence.     Sincerely,    Nichelle Haynes PA-C  Emergency Department Physician Assistant  [facility] [place] [place]

## 2024-10-14 NOTE — Clinical Note
"Nola "Hernán Rooney was seen and treated in our emergency department on 10/14/2024.  She may return to work on 10/14/2024.       If you have any questions or concerns, please don't hesitate to call.      Nichelle Haynes PA-C"

## 2024-10-14 NOTE — ED PROVIDER NOTES
Encounter Date: 10/14/2024       History     Chief Complaint   Patient presents with    Hand Pain     PT reports numbness to first 3 fingers that started Friday. PT reports pain to palm of hand that radiates to fingers that started last night     24 year old female with past medical history of PCOS presents to the ED with right hand pain x 3 days.  Patient notes intermittent numbness tingling to the right 1st-3rd digits.  Pt has wrist pain at times. Notes symptoms are worsened early morning when she wakes up and when using her hand. She is right hand dominant. Denies recent trauma or injuries. States she using her right hand often at work, especially when she has to use the [employer] cutter. No treatments attempted prior to arrival. No fever, chills, nausea, vomiting, chest pain, shortness of breath.  No other acute complaints today.    The history is provided by the patient.      Review of patient's allergies indicates:   Allergen Reactions    Sulfa (sulfonamide antibiotics) Hives     Past Medical History:   Diagnosis Date    Polycystic ovarian disease      Past Surgical History:   Procedure Laterality Date    adnoids      TONSILLECTOMY       Family History   Problem Relation Name Age of Onset    Hypertension Father      Hypertension Mother       Social History     Tobacco Use    Smoking status: Never     Passive exposure: Yes    Smokeless tobacco: Never   Substance Use Topics    Alcohol use: Yes     Comment: occ    Drug use: No     Review of Systems   Constitutional:  Negative for chills and fever.   Respiratory:  Negative for shortness of breath.    Cardiovascular:  Negative for chest pain.   Gastrointestinal:  Negative for abdominal distention, abdominal pain, nausea and vomiting.   Musculoskeletal:  Negative for neck pain and neck stiffness.   Skin:  Negative for color change.       Physical Exam     Initial Vitals [10/14/24 0949]   BP Pulse Resp Temp SpO2   137/84 96 18 98.4 °F (36.9 °C) 98 %      MAP       --          Physical Exam    Vitals reviewed.  Constitutional: She appears well-developed and well-nourished. She is not diaphoretic. No distress.   HENT:   Head: Normocephalic and atraumatic.   Right Ear: External ear normal.   Left Ear: External ear normal. Mouth/Throat: Oropharynx is clear and moist.   Eyes: EOM are normal.   Neck: Neck supple.   Normal range of motion.  Cardiovascular:  Normal rate and normal heart sounds.           Pulmonary/Chest: Breath sounds normal. No respiratory distress. She has no wheezes.   Abdominal: Abdomen is soft. Bowel sounds are normal. She exhibits no distension. There is no abdominal tenderness.   Musculoskeletal:         General: Normal range of motion.      Cervical back: Normal range of motion and neck supple.      Comments: Normal ROM flexion and extension of the right hand. No obvious bony deformities noted. Positive Phalen's test. No overlying skin changes noted on the right hand. Sensations and NV intact.      Neurological: She is alert and oriented to person, place, and time. GCS score is 15. GCS eye subscore is 4. GCS verbal subscore is 5. GCS motor subscore is 6.   Skin: Skin is warm. Capillary refill takes less than 2 seconds.   Psychiatric: She has a normal mood and affect. Her behavior is normal. Judgment and thought content normal.         ED Course   Procedures  Labs Reviewed - No data to display       Imaging Results    None          Medications - No data to display  Medical Decision Making  Differential Diagnosis includes, but is not limited to:  Fracture, dislocation, compartment syndrome, nerve injury/palsy, vascular injury, DVT, rhabdomyolysis, hemarthrosis, septic joint, cellulitis, bursitis, muscle strain, ligament tear/sprain, laceration, foreign body, abrasion, soft tissue contusion, osteoarthritis, carpal tunnel syndrome      ED management     24 year old female with past medical history of PCOS presents to the ED with right hand pain x 3 days.  Patient is not  toxic appearing, hemodynamically stable and resting comfortably on bed. Patient is well-appearing.  Awake and alert.  Afebrile with vitals WNL. No distress on exam.  No clinical indication for imaging studies at this time.  Physical exam evidence of positive Phalen's test.  Symptoms most likely suggestive of carpal tunnel of the right hand.  Will treat suspected musculoskeletal pain with RICE, OTC pain medications. Will provide forearm/ wrist brace prior to discharge. Will place referral to hand surgery for further evaluation and care if needed.       I have discussed the specifics of the workup with the patient and the patient has verbalized understanding of the details of the workup, the diagnosis, the treatment plan, and the need for outpatient follow-up with PCP. ED precautions given. Discussed with pt about returning to the ED, if symptoms fail to improve or worsen.     RESULTS:  Documented in ED course.   Labs/ekg interpreted by myself       Voice recognition software utilized in this note. Typographical and content errors may occur with this process. While efforts are made to detect and correct such errors, in some cases errors will persist. For this reason, wording in this document should be considered in the proper context and not strictly verbatim.                                                        Clinical Impression:  Final diagnoses:  [M79.641] Right hand pain  [G56.01] Carpal tunnel syndrome of right wrist (Primary)          ED Disposition Condition    Discharge Stable          ED Prescriptions    None       Follow-up Information       Follow up With Specialties Details Why Contact Info    Your primary care provider  Schedule an appointment as soon as possible for a visit in 3 days As needed     [facility] Schedule an appointment as soon as possible for a visit in 1 week As needed, If symptoms worsen [address] [facility] [id number]  [phone]              Nichelle Haynes PA-C  10/14/24 1042

## 2024-10-17 ENCOUNTER — TELEPHONE (OUTPATIENT)
Dept: ORTHOPEDICS | Facility: CLINIC | Age: 24
End: 2024-10-17
Payer: MEDICAID

## 2024-10-17 NOTE — TELEPHONE ENCOUNTER
----- Message from Aleksandr sent at 10/17/2024  1:50 PM CDT -----  Type:  Sooner Apoointment Request    Caller is requesting a sooner appointment.  Caller declined first available appointment listed below.  Caller will not accept being placed on the waitlist and is requesting a message be sent to doctor.  Name of Caller:pt   When is the first available appointment?01/09  Symptoms: Carpal tunnel syndrome of right wrist [G56.01]  Would the patient rather a call back or a response via [facility]? Call   Best Call Back Number: [phone]  Additional Information:

## 2024-10-31 ENCOUNTER — OFFICE VISIT (OUTPATIENT)
Dept: ORTHOPEDICS | Facility: CLINIC | Age: 24
End: 2024-10-31
Payer: MEDICAID

## 2024-10-31 DIAGNOSIS — G56.01 CARPAL TUNNEL SYNDROME OF RIGHT WRIST: Primary | ICD-10-CM

## 2024-10-31 PROCEDURE — 99999PBSHW PR PBB SHADOW TECHNICAL ONLY FILED TO HB: Mod: PBBFAC,,,

## 2024-10-31 PROCEDURE — 99213 OFFICE O/P EST LOW 20 MIN: CPT | Mod: PBBFAC,PN

## 2024-10-31 PROCEDURE — 20526 THER INJECTION CARP TUNNEL: CPT | Mod: PBBFAC,PN,RT

## 2024-10-31 PROCEDURE — 99999 PR PBB SHADOW E&M-EST. PATIENT-LVL III: CPT | Mod: PBBFAC,,,

## 2024-10-31 RX ORDER — GABAPENTIN 100 MG/1
100 CAPSULE ORAL 3 TIMES DAILY
Qty: 90 CAPSULE | Refills: 2 | Status: SHIPPED | OUTPATIENT
Start: 2024-10-31 | End: 2025-01-29

## 2024-10-31 RX ORDER — TRIAMCINOLONE ACETONIDE 40 MG/ML
20 INJECTION, SUSPENSION INTRA-ARTICULAR; INTRAMUSCULAR
Status: DISCONTINUED | OUTPATIENT
Start: 2024-10-31 | End: 2024-10-31 | Stop reason: HOSPADM

## 2024-10-31 RX ADMIN — TRIAMCINOLONE ACETONIDE 20 MG: 40 INJECTION, SUSPENSION INTRA-ARTICULAR; INTRAMUSCULAR at 07:10

## 2024-11-08 ENCOUNTER — TELEPHONE (OUTPATIENT)
Dept: ORTHOPEDICS | Facility: CLINIC | Age: 24
End: 2024-11-08
Payer: MEDICAID

## 2024-11-12 ENCOUNTER — OFFICE VISIT (OUTPATIENT)
Dept: ORTHOPEDICS | Facility: CLINIC | Age: 24
End: 2024-11-12
Payer: MEDICAID

## 2024-11-12 DIAGNOSIS — G56.01 CARPAL TUNNEL SYNDROME OF RIGHT WRIST: Primary | ICD-10-CM

## 2024-11-12 PROCEDURE — 1160F RVW MEDS BY RX/DR IN RCRD: CPT | Mod: CPTII,95,,

## 2024-11-12 PROCEDURE — 1159F MED LIST DOCD IN RCRD: CPT | Mod: CPTII,95,,

## 2024-11-12 PROCEDURE — 99214 OFFICE O/P EST MOD 30 MIN: CPT | Mod: 95,,,

## 2024-11-12 NOTE — PROGRESS NOTES
Established Patient - Audio Only Telehealth Visit    The patient location is: Home  The chief complaint leading to consultation is:  Right carpal tunnel syndrome  Visit type: Virtual visit with audio only (telephone)  Total time spent with patient: 10 minutes       The reason for the audio only service rather than synchronous audio and video virtual visit was related to technical difficulties or patient preference/necessity.     Each patient to whom I provide medical services by telemedicine is:  (1) informed of the relationship between the physician and patient and the respective role of any other health care provider with respect to management of the patient; and (2) notified that they may decline to receive medical services by telemedicine and may withdraw from such care at any time. Patient verbally consented to receive this service via voice-only telephone call.      HPI: Nola Rooney is a 24 y.o. right hand dominant female who is following up for ongoing pain with her right hand numbness and tingling in her 1st-3rd digits. Patient was last seen by myself on 10/31/2024 for this issue and corticosteroid injection was performed.  Patient states previous corticosteroid injection provided minimal relief.  She denies a history of trauma.  The pain is aggravated by  and use of the hands. Associated symptoms include weakness. Patient has recently been taking previously prescribed Gabapentin and night-time wrist bracing without much relief. The pain is affecting ADLs and limiting desired level of activity. Today, I called the patient to discuss her symptoms and progress.       Occupation: [employer] Employee    Ambulating: unassisted  Diabetic:  No  Smoking:  She has never smoked.  History of DVT/PE: Negative      Assessment and Plan:    I explained the nature of the problem to the patient.     I discussed at length with the patient all the different treatment options available for her right hand including  anti-inflammatories, acetaminophen, bracing, rest, ice, heat, physical therapy, and corticosteroid injections. I explained the potential role of surgery in the treatment of this condition. The patient understands that if non-surgical measures do not adequately control symptoms, surgery will be considered in the future.       I did explain to the patient that I would like her to complete a nerve conduction study prior to surgical intervention.  Patient verbalized understanding.    Patient would like to proceed with right carpal tunnel release as an outpatient surgery. The risks and benefits of surgery were discussed with the patient today and she verbalized understand. Orders for surgery were entered. Surgery is tentatively scheduled for 01/08/2025.       Medications:  Continue with previously prescribed Gabapentin 100 mg up to TID as needed for numbness and tingling.   DME:  Continue use of wrist brace at night and with activities.    Pain Management: Ice compress to the affected area 2-3x a day for 15-20 minutes as needed for pain management.  Orders:  Bilateral upper extremity NCS/EMG order placed today through [facility] and external order sent to [facility].     Follow-Up: prior to surgery to sign consents and review EMG results.     All of the patient's questions were answered and the patient will contact us if they have any questions or concerns in the interim.    Luz Andersen PA-C  [facility]  Orthopedic Surgery    Medical Dictation software was used during the dictation of portions or the entirety of this medical record.  Phonetic or grammatic errors may exist due to the use of this software. For clarification, refer to the author of the document.    This service was not originating from a related E/M service provided within the previous 7 days nor will  to an E/M service or procedure within the next 24 hours or my soonest available appointment.  Prevailing standard of care was able to be met in this  audio-only visit.

## 2024-11-13 RX ORDER — MUPIROCIN 20 MG/G
OINTMENT TOPICAL
OUTPATIENT
Start: 2024-11-13

## 2024-12-03 ENCOUNTER — TELEPHONE (OUTPATIENT)
Dept: ORTHOPEDICS | Facility: CLINIC | Age: 24
End: 2024-12-03
Payer: MEDICAID

## 2024-12-03 NOTE — TELEPHONE ENCOUNTER
Pt called to get her EMG rescheduled because the date they gave her would not work with her insurance. I informed her she would have to call the facility where she is getting get EMG done to reschedule. She does not have an EMG scheduled with [facility] at this time.

## 2024-12-14 ENCOUNTER — HOSPITAL ENCOUNTER (EMERGENCY)
Facility: HOSPITAL | Age: 24
Discharge: HOME | End: 2024-12-14
Attending: EMERGENCY MEDICINE
Payer: MEDICAID

## 2024-12-14 VITALS
SYSTOLIC BLOOD PRESSURE: 140 MMHG | DIASTOLIC BLOOD PRESSURE: 79 MMHG | BODY MASS INDEX: 57.52 KG/M2 | TEMPERATURE: 99 F | HEIGHT: 60 IN | RESPIRATION RATE: 20 BRPM | WEIGHT: 293 LBS | HEART RATE: 110 BPM | OXYGEN SATURATION: 100 %

## 2024-12-14 DIAGNOSIS — R11.10 VOMITING AND DIARRHEA: Primary | ICD-10-CM

## 2024-12-14 DIAGNOSIS — R19.7 VOMITING AND DIARRHEA: Primary | ICD-10-CM

## 2024-12-14 LAB
B-HCG UR QL: NEGATIVE
CTP QC/QA: YES

## 2024-12-14 PROCEDURE — 99283 EMERGENCY DEPT VISIT LOW MDM: CPT | Mod: ER

## 2024-12-14 PROCEDURE — 81025 URINE PREGNANCY TEST: CPT | Mod: ER | Performed by: EMERGENCY MEDICINE

## 2024-12-14 PROCEDURE — 25000003 PHARM REV CODE 250: Mod: ER | Performed by: EMERGENCY MEDICINE

## 2024-12-14 RX ORDER — LOPERAMIDE HYDROCHLORIDE 2 MG/1
2 CAPSULE ORAL 4 TIMES DAILY PRN
Qty: 12 CAPSULE | Refills: 0 | Status: SHIPPED | OUTPATIENT
Start: 2024-12-14 | End: 2024-12-24

## 2024-12-14 RX ORDER — ONDANSETRON 4 MG/1
4 TABLET, ORALLY DISINTEGRATING ORAL EVERY 6 HOURS PRN
Qty: 20 TABLET | Refills: 0 | Status: SHIPPED | OUTPATIENT
Start: 2024-12-14

## 2024-12-14 RX ORDER — ONDANSETRON 4 MG/1
4 TABLET, ORALLY DISINTEGRATING ORAL
Status: COMPLETED | OUTPATIENT
Start: 2024-12-14 | End: 2024-12-14

## 2024-12-14 RX ORDER — LOPERAMIDE HYDROCHLORIDE 2 MG/1
4 CAPSULE ORAL
Status: COMPLETED | OUTPATIENT
Start: 2024-12-14 | End: 2024-12-14

## 2024-12-14 RX ADMIN — ONDANSETRON 4 MG: 4 TABLET, ORALLY DISINTEGRATING ORAL at 09:12

## 2024-12-14 RX ADMIN — LOPERAMIDE HYDROCHLORIDE 4 MG: 2 CAPSULE ORAL at 09:12

## 2024-12-15 NOTE — ED PROVIDER NOTES
Encounter Date: 12/14/2024       History     Chief Complaint   Patient presents with    Emesis     Nausea, vomiting and diarrhea since approx 1500 last night. Denies ABD pain.      HPI  24 y.o.   Co nvd x 1 day  No dizziness, sob  No bleeding  No travel, sick contacts, new foods, recent abx  No abd pain    Review of patient's allergies indicates:   Allergen Reactions    Sulfa (sulfonamide antibiotics) Hives     Past Medical History:   Diagnosis Date    Polycystic ovarian disease      Past Surgical History:   Procedure Laterality Date    adnoids      TONSILLECTOMY       Family History   Problem Relation Name Age of Onset    Hypertension Father      Hypertension Mother       Social History     Tobacco Use    Smoking status: Never     Passive exposure: Yes    Smokeless tobacco: Never   Substance Use Topics    Alcohol use: Yes     Comment: occ    Drug use: No     Review of Systems  All systems were reviewed/examined and were negative except as noted in the HPI.    Physical Exam     Initial Vitals [12/14/24 2015]   BP Pulse Resp Temp SpO2   (!) 140/79 110 20 98.9 °F (37.2 °C) 100 %      MAP       --         Physical Exam    General: the patient is awake, alert, and in no apparent distress.  The patient appears well hydrated and nontoxic.  Head: normocephalic and atraumatic, sclera are clear  Neck: supple without meningismus  Chest: no respiratory distress  Heart: regular rate and rhythm  ABD soft, nontender, nondistended, no peritoneal signs  Extremities: warm and well perfused  Skin: warm and dry  Psych conversant  Neuro: awake, alert, moving all extremities    ED Course   Procedures  Labs Reviewed   POCT URINE PREGNANCY       Result Value    POC Preg Test, Ur Negative       Acceptable Yes            Imaging Results    None          Medications   ondansetron disintegrating tablet 4 mg (4 mg Oral Given 12/14/24 2105)   loperamide capsule 4 mg (4 mg Oral Given 12/14/24 2105)     Medical Decision Making                    Medical Decision Making:    This is an emergent evaluation of a patient presenting to the ED.  Nursing notes were reviewed.  Hcg neg  I personally reviewed and interpreted the laboratory results.    I decided to obtain and review old medical records, which showed: ED visits    Evaluation for Emergency Medical Condition  The patient received a medical screening exam and within a reasonable degree of clinical confidence an emergency medical condition has not been identified.  The patient is instructed on proper follow up and return precautions to the ED.      David Wei MD, LESLIE                   Clinical Impression:  Final diagnoses:  [R11.10, R19.7] Vomiting and diarrhea (Primary)          ED Disposition Condition    Discharge Stable          ED Prescriptions       Medication Sig Dispense Start Date End Date Auth. Provider    ondansetron (ZOFRAN-ODT) 4 MG TbDL Take 1 tablet (4 mg total) by mouth every 6 (six) hours as needed. 20 tablet 12/14/2024 -- Abran Wei MD    loperamide (IMODIUM) 2 mg capsule Take 1 capsule (2 mg total) by mouth 4 (four) times daily as needed for Diarrhea. 12 capsule 12/14/2024 12/24/2024 Abran Wei MD          Follow-up Information       Follow up With Specialties Details Why Contact Info    Mei Blunt, P Family Medicine Schedule an appointment as soon as possible for a visit   [address] [place] [address]  [phone]            Discharged to home in stable condition, return to ED warnings given, follow up and patient care instructions given.      David Wei MD, LESLIE, [facility]  Department of Emergency Medicine       Abran Wei MD  12/16/24 8987

## 2024-12-30 ENCOUNTER — TELEPHONE (OUTPATIENT)
Dept: ORTHOPEDICS | Facility: CLINIC | Age: 24
End: 2024-12-30
Payer: MEDICAID

## 2025-01-07 ENCOUNTER — TELEPHONE (OUTPATIENT)
Dept: ORTHOPEDICS | Facility: CLINIC | Age: 25
End: 2025-01-07
Payer: MEDICAID

## 2025-01-07 NOTE — TELEPHONE ENCOUNTER
Spoke with patient. Informed her that the hospital will contact her this afternoon with arrival time and fasting instructions.

## 2025-01-07 NOTE — TELEPHONE ENCOUNTER
----- Message from Abraham sent at 1/7/2025 10:21 AM CST -----  Contact: pt @ [phone]  Name of Who is Calling: pt        What is the request in detail: calling to get instructions and time for the procedure tomorrow         Can the clinic reply by [facility]: no        What Number to Call Back [facility] not [facility]:  [phone]

## 2025-01-08 PROBLEM — G56.01 CARPAL TUNNEL SYNDROME OF RIGHT WRIST: Status: ACTIVE | Noted: 2025-01-08

## 2025-03-19 ENCOUNTER — TELEPHONE (OUTPATIENT)
Dept: ORTHOPEDICS | Facility: CLINIC | Age: 25
End: 2025-03-19
Payer: MEDICAID

## 2025-03-19 ENCOUNTER — PATIENT MESSAGE (OUTPATIENT)
Dept: ORTHOPEDICS | Facility: CLINIC | Age: 25
End: 2025-03-19
Payer: MEDICAID

## 2025-03-19 NOTE — TELEPHONE ENCOUNTER
Pt states her surgery was on 1/8 and she has not had a post op appointment yet due to being called into work. Pt states she is in significant pain and that she would like the providers to call in something to help with the pain. Advised patient that she would need to come in to be evaluated before they would call her in any medication since she has not been seen since surgery. Pt verbalized understanding. Appointment scheduled.     ----- Message from Yon sent at 3/19/2025  2:47 PM CDT -----  Regarding: pt  Name of Who is Calling:Pt What is the request in detail: Requesting callback in regards to previous surgery Can the clinic reply by [facility]: yes What Number [facility] Call Back [facility]:Telephone Information:[employer]          [phone]

## 2025-03-21 ENCOUNTER — HOSPITAL ENCOUNTER (EMERGENCY)
Facility: HOSPITAL | Age: 25
Discharge: HOME | End: 2025-03-21
Attending: EMERGENCY MEDICINE
Payer: MEDICAID

## 2025-03-21 VITALS
OXYGEN SATURATION: 99 % | TEMPERATURE: 99 F | DIASTOLIC BLOOD PRESSURE: 77 MMHG | SYSTOLIC BLOOD PRESSURE: 133 MMHG | HEIGHT: 60 IN | BODY MASS INDEX: 57.52 KG/M2 | WEIGHT: 293 LBS | HEART RATE: 115 BPM | RESPIRATION RATE: 20 BRPM

## 2025-03-21 DIAGNOSIS — G89.18 POST-OP PAIN: Primary | ICD-10-CM

## 2025-03-21 DIAGNOSIS — M25.531 WRIST PAIN, RIGHT: ICD-10-CM

## 2025-03-21 LAB
B-HCG UR QL: NEGATIVE
CTP QC/QA: YES

## 2025-03-21 PROCEDURE — 96372 THER/PROPH/DIAG INJ SC/IM: CPT | Performed by: EMERGENCY MEDICINE

## 2025-03-21 PROCEDURE — 63600175 PHARM REV CODE 636 W HCPCS: Mod: JZ,TB,ER | Performed by: EMERGENCY MEDICINE

## 2025-03-21 PROCEDURE — 99284 EMERGENCY DEPT VISIT MOD MDM: CPT | Mod: 25,ER

## 2025-03-21 PROCEDURE — 81025 URINE PREGNANCY TEST: CPT | Mod: ER | Performed by: EMERGENCY MEDICINE

## 2025-03-21 PROCEDURE — 25000003 PHARM REV CODE 250: Mod: ER | Performed by: EMERGENCY MEDICINE

## 2025-03-21 RX ORDER — HYDROCODONE BITARTRATE AND ACETAMINOPHEN 5; 325 MG/1; MG/1
2 TABLET ORAL
Refills: 0 | Status: COMPLETED | OUTPATIENT
Start: 2025-03-21 | End: 2025-03-21

## 2025-03-21 RX ORDER — KETOROLAC TROMETHAMINE 30 MG/ML
30 INJECTION, SOLUTION INTRAMUSCULAR; INTRAVENOUS
Status: COMPLETED | OUTPATIENT
Start: 2025-03-21 | End: 2025-03-21

## 2025-03-21 RX ORDER — HYDROCODONE BITARTRATE AND ACETAMINOPHEN 5; 325 MG/1; MG/1
1 TABLET ORAL EVERY 6 HOURS PRN
Qty: 12 TABLET | Refills: 0 | Status: SHIPPED | OUTPATIENT
Start: 2025-03-21 | End: 2025-03-24

## 2025-03-21 RX ORDER — NAPROXEN 500 MG/1
500 TABLET ORAL 2 TIMES DAILY WITH MEALS
Qty: 10 TABLET | Refills: 0 | Status: SHIPPED | OUTPATIENT
Start: 2025-03-21 | End: 2025-03-26

## 2025-03-21 RX ADMIN — HYDROCODONE BITARTRATE AND ACETAMINOPHEN 2 TABLET: 5; 325 TABLET ORAL at 02:03

## 2025-03-21 RX ADMIN — KETOROLAC TROMETHAMINE 30 MG: 30 INJECTION, SOLUTION INTRAMUSCULAR; INTRAVENOUS at 02:03

## 2025-03-21 NOTE — Clinical Note
"Nola "Hernán Rooney was seen and treated in our emergency department on 3/21/2025.  She may return with limitations on 03/24/2025.  Activity as tolerated until cleared by orthopedic physician     Sincerely,      Bob Pagan Jr., MD    "

## 2025-03-21 NOTE — Clinical Note
"Nola "Hernán Rooney was seen and treated in our emergency department on 3/21/2025.  She may return to work on 03/24/2025.       If you have any questions or concerns, please don't hesitate to call.      Bob Pagan Jr., MD"

## 2025-03-21 NOTE — ED PROVIDER NOTES
Encounter Date: 3/21/2025       History     Chief Complaint   Patient presents with    Hand Pain     Pt reports she hit her hand at work at incision site from previous carpal tunnel surgery in January 2025 to the right hand. Pt reports the pain is so bad and is not being relieved by gabapentin, tylenol or motrin.      Nola Rooney is a 25 y.o. female who  has a past medical history of Carpal tunnel syndrome on right, History of bladder infections, Polycystic ovarian disease, Scoliosis, and Vitamin D deficiency.    The patient presents to the ED due to right wrist pain.  Patient states that she injured her wrist 2 weeks ago striking it on a hard object.  She reports she had carpal tunnel syndrome and had a repair and she hit her wrist along her incision site.  Her pain is exacerbated by movement she has tried taking Tylenol ibuprofen and gabapentin without relief.      Review of patient's allergies indicates:   Allergen Reactions    Sulfa (sulfonamide antibiotics) Hives    Crab Diarrhea and Nausea And Vomiting     Aubrey crabs     Past Medical History:   Diagnosis Date    Carpal tunnel syndrome on right     History of bladder infections     as a child    Polycystic ovarian disease     Scoliosis     Vitamin D deficiency      Past Surgical History:   Procedure Laterality Date    ANGIOGRAM, RENAL, UNILATERAL, PEDIATRIC Left     no sedation given    CARPAL TUNNEL RELEASE Right 1/8/2025    Procedure: RELEASE, CARPAL TUNNEL;  Surgeon: Bob Lima Jr., MD;  Location: [facility];  Service: Orthopedics;  Laterality: Right;    TONSILLECTOMY, ADENOIDECTOMY      WISDOM TOOTH EXTRACTION       Family History   Problem Relation Name Age of Onset    Hypertension Father      Hypertension Mother       Social History[1]  Review of Systems   Musculoskeletal:  Positive for arthralgias.       Physical Exam     Initial Vitals   BP Pulse Resp Temp SpO2   03/21/25 0156 03/21/25 0156 03/21/25 0156 03/21/25 0156 03/21/25 0200   133/77 (!)  115 20 98.8 °F (37.1 °C) 99 %      MAP       --                Physical Exam    Constitutional: No distress.   HENT:   Head: Normocephalic and atraumatic.   Eyes: Conjunctivae are normal.   Cardiovascular:  Intact distal pulses.           Pulmonary/Chest: No respiratory distress.   Musculoskeletal:      Comments: Right upper extremity: Sensation intact to light touch, radial pulse palpable.  No apparent tenderness to palpation on exam.  Incision noted to volar aspect of palm/wrist clean dry and intact without apparent tenderness.  Able to make a pincer grasp close fist without pain.     Neurological: She is alert.   Skin: Skin is warm and dry.   Psychiatric: She has a normal mood and affect.         ED Course   Procedures  Labs Reviewed   POCT URINE PREGNANCY       Result Value    POC Preg Test, Ur Negative       Acceptable Yes            Imaging Results              X-Ray Wrist Complete Right (Final result)  Result time 03/21/25 07:30:54      Final result by Aleksandr Mckenzie MD (03/21/25 07:30:54)                   Impression:      No acute fracture or dislocation.      Electronically signed by: Aleksandr Mckenzie MD  Date:    03/21/2025  Time:    07:30               Narrative:    EXAMINATION:  XR WRIST COMPLETE 3 VIEWS RIGHT    CLINICAL HISTORY:  XR WRIST COMPLETE 3 VIEWS RIGHTPain in right wrist    COMPARISON:  06/15/2024    FINDINGS:  Three views of the right wrist were obtained.    No evidence of acute fracture or dislocation.  Bony mineralization is normal.  Soft tissues are unremarkable.                                       Medications   HYDROcodone-acetaminophen 5-325 mg per tablet 2 tablet (2 tablets Oral Given 3/21/25 0234)   ketorolac injection 30 mg (30 mg Intramuscular Given 3/21/25 0234)     Medical Decision Making  Differential Diagnosis includes, but is not limited to:  Fracture, dislocation, compartment syndrome, nerve injury/palsy, vascular injury, rhabdomyolysis, hemarthrosis, septic  joint, bursitis, muscle strain, ligament tear/sprain, laceration with foreign body, abrasion, soft tissue contusion, osteoarthritis.      Amount and/or Complexity of Data Reviewed  Labs: ordered.  Radiology: ordered. Decision-making details documented in ED Course.    Risk  Prescription drug management.  Decision regarding hospitalization.  Diagnosis or treatment significantly limited by social determinants of health.  Risk Details: Patient is a 25-year-old woman presenting with wrist pain for the past couple of weeks in the setting of recent carpal tunnels tree.  Her vital signs are stable.  She is neurovascularly intact.  No signs to suggest infectious process.  X-rays without findings to suggest fracture or dislocation.  Low suspicion for emergent process today based on exam and current available information.  Symptoms may be related to pillar pain.  She appears stable for discharge, close follow-up with PCP/hand surgery advised, return precautions discussed for worsening symptoms or new symptoms of concern.    After taking into careful account the historical factors and physical exam findings of the patient's presentation today, in conjunction with the empirical and objective data obtained on ED workup, no acute emergent medical condition has been identified. The patient appears to be low risk for an emergent medical condition and I feel it is safe and appropriate at this time for the patient to be discharged to follow-up as detailed in their discharge instructions for reevaluation and possible continued outpatient workup and management. I have discussed the specifics of the workup with the patient and the patient has verbalized understanding of the details of the workup, the diagnosis, the treatment plan, and the need for outpatient follow-up.  Although the patient has no emergent etiology today this does not preclude the development of an emergent condition so in addition, I have advised the patient that they  can return to the ED and/or activate EMS at any time with worsening of their symptoms, change of their symptoms, or with any other medical complaint.  The patient remained comfortable and stable during their visit in the ED.  Discharge and follow-up instructions discussed with the patient who expressed understanding and willingness to comply with my recommendations.                 ED Course as of 03/22/25 0104   Fri Mar 21, 2025   0306 X-Ray Wrist Complete Right  X-Ray Reviewed. Negative for acute fracture or dislocation by my independent interpretation, awaiting formal radiology read.   [RN]      ED Course User Index  [RN] oBb Pagan Jr., MD                           Clinical Impression:  Final diagnoses:  [M25.531] Wrist pain, right  [G89.18] Post-op pain (Primary)          ED Disposition Condition    Discharge Stable          ED Prescriptions       Medication Sig Dispense Start Date End Date Auth. Provider    HYDROcodone-acetaminophen (NORCO) 5-325 mg per tablet Take 1 tablet by mouth every 6 (six) hours as needed. 12 tablet 3/21/2025 3/24/2025 Bob Pagan Jr., MD    naproxen (NAPROSYN) 500 MG tablet Take 1 tablet (500 mg total) by mouth 2 (two) times daily with meals. for 5 days 10 tablet 3/21/2025 3/26/2025 Bob Pagan Jr., MD          Follow-up Information       Follow up With Specialties Details Why Contact Info    Bob Lima Jr., MD Hand Surgery, Orthopedic Surgery Schedule an appointment as soon as possible for a visit   [address] [place] [id number]  [phone]              Portions of this note were dictated using voice recognition software and may contain dictation related errors in spelling/grammar/syntax not found on text review         [1]   Social History  Tobacco Use    Smoking status: Never     Passive exposure: Yes    Smokeless tobacco: Never   Substance Use Topics    Alcohol use: Yes     Comment: occ    Drug use: No        Bob Pagan Jr.  MD  03/22/25 0104

## 2025-03-21 NOTE — ED NOTES
Discharge instructions explained to patient. Rx and follow up instructions reviewed, patient verbalized understanding. AVS provided. Patient stable, no signs or symptoms of distress. Patient safe to go. All belongings gathered and confirmed. Patient left via ambulatory with a steady gait out of ED.

## 2025-03-21 NOTE — DISCHARGE INSTRUCTIONS

## 2025-03-27 ENCOUNTER — RESULTS FOLLOW-UP (OUTPATIENT)
Dept: FAMILY MEDICINE | Facility: CLINIC | Age: 25
End: 2025-03-27

## 2025-03-28 DIAGNOSIS — I10 ESSENTIAL (PRIMARY) HYPERTENSION: Primary | ICD-10-CM

## 2025-04-01 ENCOUNTER — HOSPITAL ENCOUNTER (EMERGENCY)
Facility: HOSPITAL | Age: 25
Discharge: HOME | End: 2025-04-01
Attending: EMERGENCY MEDICINE
Payer: MEDICAID

## 2025-04-01 VITALS
OXYGEN SATURATION: 96 % | HEIGHT: 60 IN | HEART RATE: 135 BPM | SYSTOLIC BLOOD PRESSURE: 135 MMHG | RESPIRATION RATE: 18 BRPM | WEIGHT: 293 LBS | BODY MASS INDEX: 57.52 KG/M2 | TEMPERATURE: 98 F | DIASTOLIC BLOOD PRESSURE: 83 MMHG

## 2025-04-01 DIAGNOSIS — R03.0 ELEVATED BLOOD PRESSURE READING: ICD-10-CM

## 2025-04-01 DIAGNOSIS — R07.9 CHEST PAIN: ICD-10-CM

## 2025-04-01 DIAGNOSIS — R11.10 VOMITING, UNSPECIFIED VOMITING TYPE, UNSPECIFIED WHETHER NAUSEA PRESENT: Primary | ICD-10-CM

## 2025-04-01 LAB
ABSOLUTE EOSINOPHIL (OHS): 0.09 K/UL
ABSOLUTE MONOCYTE (OHS): 0.67 K/UL (ref 0.3–1)
ABSOLUTE NEUTROPHIL COUNT (OHS): 10.78 K/UL (ref 1.8–7.7)
ALBUMIN SERPL BCP-MCNC: 4.4 G/DL (ref 3.5–5.2)
ALP SERPL-CCNC: 91 UNIT/L (ref 38–126)
ALT SERPL W/O P-5'-P-CCNC: 37 UNIT/L (ref 10–44)
AMPHET UR QL SCN: NEGATIVE
ANION GAP (OHS): 13 MMOL/L (ref 8–16)
AST SERPL-CCNC: 25 UNIT/L (ref 15–46)
B-HCG UR QL: NEGATIVE
BARBITURATE SCN PRESENT UR: NEGATIVE
BASOPHILS # BLD AUTO: 0.04 K/UL
BASOPHILS NFR BLD AUTO: 0.3 %
BENZODIAZ UR QL SCN: NEGATIVE
BILIRUB SERPL-MCNC: 1 MG/DL (ref 0.1–1)
BUN SERPL-MCNC: 8 MG/DL (ref 7–17)
CALCIUM SERPL-MCNC: 9.1 MG/DL (ref 8.7–10.5)
CANNABINOIDS UR QL SCN: NEGATIVE
CHLORIDE SERPL-SCNC: 104 MMOL/L (ref 95–110)
CO2 SERPL-SCNC: 19 MMOL/L (ref 23–29)
COCAINE UR QL SCN: NEGATIVE
CREAT SERPL-MCNC: 0.5 MG/DL (ref 0.5–1.4)
CREAT UR-MCNC: 239.9 MG/DL (ref 15–325)
CTP QC/QA: YES
ERYTHROCYTE [DISTWIDTH] IN BLOOD BY AUTOMATED COUNT: 13.3 % (ref 11.5–14.5)
GFR SERPLBLD CREATININE-BSD FMLA CKD-EPI: >60 ML/MIN/1.73/M2
GLUCOSE SERPL-MCNC: 76 MG/DL (ref 70–110)
HCT VFR BLD AUTO: 42.6 % (ref 37–48.5)
HGB BLD-MCNC: 13.6 GM/DL (ref 12–16)
IMM GRANULOCYTES # BLD AUTO: 0.04 K/UL (ref 0–0.04)
IMM GRANULOCYTES NFR BLD AUTO: 0.3 % (ref 0–0.5)
LYMPHOCYTES # BLD AUTO: 1.98 K/UL (ref 1–4.8)
MCH RBC QN AUTO: 27.5 PG (ref 27–31)
MCHC RBC AUTO-ENTMCNC: 31.9 G/DL (ref 32–36)
MCV RBC AUTO: 86 FL (ref 82–98)
METHADONE UR QL SCN: NEGATIVE
NT-PROBNP SERPL-MCNC: <20 PG/ML (ref 5–450)
NUCLEATED RBC (/100WBC) (OHS): 0 /100 WBC
OPIATES UR QL SCN: NEGATIVE
PCP UR QL: NEGATIVE
PLATELET # BLD AUTO: 348 K/UL (ref 150–450)
PMV BLD AUTO: 10.9 FL (ref 9.2–12.9)
POTASSIUM SERPL-SCNC: 3.9 MMOL/L (ref 3.5–5.1)
PROT SERPL-MCNC: 7.6 GM/DL (ref 6–8.4)
RBC # BLD AUTO: 4.94 M/UL (ref 4–5.4)
RELATIVE EOSINOPHIL (OHS): 0.7 %
RELATIVE LYMPHOCYTE (OHS): 14.6 % (ref 18–48)
RELATIVE MONOCYTE (OHS): 4.9 % (ref 4–15)
RELATIVE NEUTROPHIL (OHS): 79.2 % (ref 38–73)
SODIUM SERPL-SCNC: 136 MMOL/L (ref 136–145)
TROPONIN I SERPL DL<=0.01 NG/ML-MCNC: <0.012 NG/ML (ref 0.01–0.03)
WBC # BLD AUTO: 13.6 K/UL (ref 3.9–12.7)

## 2025-04-01 PROCEDURE — 80307 DRUG TEST PRSMV CHEM ANLYZR: CPT | Mod: ER | Performed by: EMERGENCY MEDICINE

## 2025-04-01 PROCEDURE — 85025 COMPLETE CBC W/AUTO DIFF WBC: CPT | Mod: ER | Performed by: EMERGENCY MEDICINE

## 2025-04-01 PROCEDURE — 83880 ASSAY OF NATRIURETIC PEPTIDE: CPT | Mod: ER | Performed by: EMERGENCY MEDICINE

## 2025-04-01 PROCEDURE — 80053 COMPREHEN METABOLIC PANEL: CPT | Mod: ER | Performed by: EMERGENCY MEDICINE

## 2025-04-01 PROCEDURE — 63600175 PHARM REV CODE 636 W HCPCS: Mod: ER | Performed by: EMERGENCY MEDICINE

## 2025-04-01 PROCEDURE — 96374 THER/PROPH/DIAG INJ IV PUSH: CPT | Mod: ER

## 2025-04-01 PROCEDURE — 81025 URINE PREGNANCY TEST: CPT | Mod: ER | Performed by: EMERGENCY MEDICINE

## 2025-04-01 PROCEDURE — 94760 N-INVAS EAR/PLS OXIMETRY 1: CPT | Mod: ER

## 2025-04-01 PROCEDURE — 99900035 HC TECH TIME PER 15 MIN (STAT): Mod: ER

## 2025-04-01 PROCEDURE — 93005 ELECTROCARDIOGRAM TRACING: CPT | Mod: ER

## 2025-04-01 PROCEDURE — 93010 ELECTROCARDIOGRAM REPORT: CPT | Mod: ,,, | Performed by: INTERNAL MEDICINE

## 2025-04-01 PROCEDURE — 84484 ASSAY OF TROPONIN QUANT: CPT | Mod: ER | Performed by: EMERGENCY MEDICINE

## 2025-04-01 PROCEDURE — 99285 EMERGENCY DEPT VISIT HI MDM: CPT | Mod: 25,ER

## 2025-04-01 RX ORDER — ONDANSETRON 4 MG/1
4 TABLET, ORALLY DISINTEGRATING ORAL EVERY 6 HOURS PRN
Qty: 20 TABLET | Refills: 0 | Status: SHIPPED | OUTPATIENT
Start: 2025-04-01

## 2025-04-01 RX ORDER — ONDANSETRON HYDROCHLORIDE 2 MG/ML
4 INJECTION, SOLUTION INTRAVENOUS
Status: COMPLETED | OUTPATIENT
Start: 2025-04-01 | End: 2025-04-01

## 2025-04-01 RX ORDER — PROMETHAZINE HYDROCHLORIDE 25 MG/1
25 SUPPOSITORY RECTAL EVERY 6 HOURS PRN
Qty: 10 SUPPOSITORY | Refills: 0 | Status: SHIPPED | OUTPATIENT
Start: 2025-04-01

## 2025-04-01 RX ADMIN — ONDANSETRON 4 MG: 2 INJECTION INTRAMUSCULAR; INTRAVENOUS at 08:04

## 2025-04-02 LAB
OHS QRS DURATION: 70 MS
OHS QTC CALCULATION: 474 MS

## 2025-04-02 NOTE — ED NOTES
Went into d/c the patient and her hr is 150 and Dr Wei aware and new EKG ordered and shown to md, sinus tach , no c/o chest pain or sob.

## 2025-04-02 NOTE — ED PROVIDER NOTES
Encounter Date: 4/1/2025       History     Chief Complaint   Patient presents with    Hypertension     Pt states that her HR and BP has been elevated for the past week. Per pt she was recently placed on nifedipine.      HPI  25 y.o.   States BP and HR up and nausea vomiting and chest pain x several days  Recently started on mounjarno and nifedipine  No other exac/remitting factors  Mod  Nr    Review of patient's allergies indicates:   Allergen Reactions    Sulfa (sulfonamide antibiotics) Hives    Crab Diarrhea and Nausea And Vomiting     Aubrey crabs     Past Medical History:   Diagnosis Date    Carpal tunnel syndrome on right     History of bladder infections     as a child    Polycystic ovarian disease     Scoliosis     Vitamin D deficiency      Past Surgical History:   Procedure Laterality Date    ANGIOGRAM, RENAL, UNILATERAL, PEDIATRIC Left     no sedation given    CARPAL TUNNEL RELEASE Right 1/8/2025    Procedure: RELEASE, CARPAL TUNNEL;  Surgeon: Bob Lima Jr., MD;  Location: [facility];  Service: Orthopedics;  Laterality: Right;    TONSILLECTOMY, ADENOIDECTOMY      WISDOM TOOTH EXTRACTION       Family History   Problem Relation Name Age of Onset    Hypertension Father      Hypertension Mother       Social History[1]  Review of Systems  All systems were reviewed/examined and were negative except as noted in the HPI.    Physical Exam     Initial Vitals [04/01/25 1934]   BP Pulse Resp Temp SpO2   134/89 (!) 116 17 98.3 °F (36.8 °C) 100 %      MAP       --         Physical Exam    General: the patient is awake, alert, and in no apparent distress.  Head: normocephalic and atraumatic, sclera are clear  Neck: supple without meningismus  Chest: clear to auscultation bilaterally, no respiratory distress  Heart: regular rate and rhythm borderline tachy  ABD soft, nontender, nondistended, no peritoneal signs  No calf t no edema  Extremities: warm and well perfused  Skin: warm and dry  Psych conversant  Neuro: awake,  alert, moving all extremities    ED Course   Procedures  Labs Reviewed   COMPREHENSIVE METABOLIC PANEL - Abnormal       Result Value    Sodium 136      Potassium 3.9      Chloride 104      CO2 19 (*)     Glucose 76      BUN 8      Creatinine 0.5      Calcium 9.1      Protein Total 7.6      Albumin 4.4      Bilirubin Total 1.0      ALP 91      AST 25      ALT 37      Anion Gap 13      eGFR >60     TROPONIN I - Abnormal    Troponin-I <0.012 (*)    CBC WITH DIFFERENTIAL - Abnormal    WBC 13.60 (*)     RBC 4.94      HGB 13.6      HCT 42.6      MCV 86      MCH 27.5      MCHC 31.9 (*)     RDW 13.3      Platelet Count 348      MPV 10.9      Nucleated RBC 0      Neut % 79.2 (*)     Lymph % 14.6 (*)     Mono % 4.9      Eos % 0.7      Basophil % 0.3      Imm Grans % 0.3      Neut # 10.78 (*)     Lymph # 1.98      Mono # 0.67      Eos # 0.09      Baso # 0.04      Imm Grans # 0.04     NT-PRO NATRIURETIC PEPTIDE - Normal    NT-proBNP <20     DRUG SCREEN PANEL, URINE EMERGENCY - Normal    Benzodiazepine, Urine Negative      Methadone, Urine Negative      Cocaine, Urine Negative      Opiates, Urine Negative      Barbiturates, Urine Negative      Amphetamines, Urine Negative      THC Negative      Phencyclidine, Urine Negative      Urine Creatinine 239.9      Narrative:     This screen includes the following classes of drugs at the   listed cut-off:      Amph =999 ng/ml  Codi  =199 ng/ml  Scottie =199 ng/ml  THC =49.9 ng/ml  COCM =299 ng/ml  METD =299 ng/ml  OP =299 ng/ml  PCP = 24.9 ng/ml    High concentrations of Diphenhydramine may cross-react with  Phencyclidine PCP screening immunoassay giving a false   positive result.    High concentrations of Methylenedioxymethamphetamine (MDMA aka  Ectasy) and other structurally similar compounds may cross-   react with the Amphetamine/Methamphetamine screening   immunoassay giving a false positive result.    A metabolite of the anti-HIV drug Sustiva () may cause  false positive  results in the Marijuana metabolite (THC)   screening assay.    Note: This exception list includes only more common   interferants in toxicology screen testing.  Because of many   cross-reactants, positive results on toxicology drug screens   should be confirmed whenever results do not correlate with   clinical presentation.    This report is intended for use in clinical monitoring and  management of patients. It is not intended for use in   employment related drug testing.    Presumptive positive results are unconfirmed and may be used   only for medical purposes.    Assay Intended Use: This assay provides only a preliminary analytical  test result. A more specific alternate chemical method must be used  to obtain a confirmed analytical result. Gas chromatography/mass  spectrometry (GS/MS)is the preferred confirmatory method. Clinical  consideration and professional judgement should be applied to any   drug of abuse test result, particularly when preliminary results  are used.   CBC W/ AUTO DIFFERENTIAL    Narrative:     The following orders were created for panel order CBC auto differential.  Procedure                               Abnormality         Status                     ---------                               -----------         ------                     CBC with Differential[[id number]]       Abnormal            Final result                 Please view results for these tests on the individual orders.   POCT URINE PREGNANCY    POC Preg Test, Ur Negative       Acceptable Yes            Imaging Results              X-Ray Chest AP Portable (Final result)  Result time 04/01/25 20:52:48      Final result by Isaac Nelson III, MD (04/01/25 20:52:48)                   Impression:     No acute chest findings.  No change since 03/30/2025.    Finalized on: 4/1/2025 8:52 PM By:  Isaac Nelson MD  [facility]# [id number]      2025-04-01 20:54:51.537     [facility]               Narrative:    EXAM: XR  CHEST AP PORTABLE    CLINICAL HISTORY: Chest pain unspecified.    Chest x-ray 2 views    COMPARISON: 03/30/2025    FINDINGS:  Lungs clear.  Heart size within normal limits.  No acute bony findings.  Discharge upper abdomen normal.                                             Medications   ondansetron injection 4 mg (4 mg Intravenous Given 4/1/25 2032)     Medical Decision Making  Amount and/or Complexity of Data Reviewed  Labs: ordered.  Radiology: ordered.    Risk  Prescription drug management.    Medical Decision Making:    This is an emergent evaluation of a patient presenting to the ED.  Nursing notes were reviewed.  I personally reviewed, read, and interpreted the ECG and any monitoring strips.  ECG: unchanged from previous tracings, sinus tachycardia   There are no critical interval prolongations nor critical ST-segment ischemic changes.  Compared with prior if available.  Read and interpreted by me independently.      I reviewed radiology images personally along with interpretations.  Imaging reviewed by me (chest x-ray), personally and independently, and prelims if available.  No acute/emergent pathologic findings noted on radiologic studies ordered.    I personally reviewed and interpreted the laboratory results.  Labs non critical    I decided to obtain and review old medical records, which showed: ED visits    Evaluation for Emergency Medical Condition  The patient received a medical screening exam and within a reasonable degree of clinical confidence an emergency medical condition has not been identified.  The patient is instructed on proper follow up and return precautions to the ED.        David Wei MD, LESLIE                                    Clinical Impression:  Final diagnoses:  [R07.9] Chest pain  [R11.10] Vomiting, unspecified vomiting type, unspecified whether nausea present (Primary)  [R03.0] Elevated blood pressure reading          ED Disposition Condition    Discharge Stable          ED  Prescriptions       Medication Sig Dispense Start Date End Date Auth. Provider    ondansetron (ZOFRAN-ODT) 4 MG TbDL Take 1 tablet (4 mg total) by mouth every 6 (six) hours as needed. 20 tablet 4/1/2025 -- Abran Wei MD    promethazine (PHENERGAN) 25 MG suppository Place 1 suppository (25 mg total) rectally every 6 (six) hours as needed for Nausea. 10 suppository 4/1/2025 -- Abran Wei MD          Follow-up Information       Follow up With Specialties Details Why Contact Info    Mei Blunt, P Family Medicine Schedule an appointment as soon as possible for a visit   [address] [place] [address]  [phone]            Discharged to home in stable condition, return to ED warnings given, follow up and patient care instructions given.      David Wei MD, LESLIE, [facility]  Department of Emergency Medicine         [1]   Social History  Tobacco Use    Smoking status: Never     Passive exposure: Yes    Smokeless tobacco: Never   Substance Use Topics    Alcohol use: Yes     Comment: occ    Drug use: No        Abran Wei MD  04/02/25 0317

## 2025-04-02 NOTE — ED NOTES
Pt is very anxious and nervous about her hr being so high, informed her that the meds she is on are for both hr and bp, pt has only been on them 3 days and it takes time. Instructed her to not check her bp several times a day, maybe only 4 times a day and to keep a log to bring with her to the dr. Instructed her to call her primary in the am and inform them that she was seen again in the er and what was going on and get a referral to the cards.

## 2025-04-08 ENCOUNTER — HOSPITAL ENCOUNTER (EMERGENCY)
Facility: HOSPITAL | Age: 25
Discharge: HOME | End: 2025-04-09
Attending: STUDENT IN AN ORGANIZED HEALTH CARE EDUCATION/TRAINING PROGRAM
Payer: MEDICAID

## 2025-04-08 DIAGNOSIS — R00.0 CHRONIC TACHYCARDIA: ICD-10-CM

## 2025-04-08 DIAGNOSIS — Z86.79 HISTORY OF SUPRAVENTRICULAR TACHYCARDIA: ICD-10-CM

## 2025-04-08 DIAGNOSIS — I47.10 SVT (SUPRAVENTRICULAR TACHYCARDIA): ICD-10-CM

## 2025-04-08 DIAGNOSIS — R07.9 CHEST PAIN: Primary | ICD-10-CM

## 2025-04-08 LAB
ABSOLUTE EOSINOPHIL (OHS): 0.11 K/UL
ABSOLUTE MONOCYTE (OHS): 0.99 K/UL (ref 0.3–1)
ABSOLUTE NEUTROPHIL COUNT (OHS): 10.4 K/UL (ref 1.8–7.7)
ALBUMIN SERPL BCP-MCNC: 4.4 G/DL (ref 3.5–5.2)
ALP SERPL-CCNC: 85 UNIT/L (ref 38–126)
ALT SERPL W/O P-5'-P-CCNC: 25 UNIT/L (ref 10–44)
ANION GAP (OHS): 12 MMOL/L (ref 8–16)
AST SERPL-CCNC: 20 UNIT/L (ref 15–46)
BASOPHILS # BLD AUTO: 0.05 K/UL
BASOPHILS NFR BLD AUTO: 0.3 %
BILIRUB SERPL-MCNC: 0.4 MG/DL (ref 0.1–1)
BILIRUB UR QL STRIP.AUTO: NEGATIVE
BUN SERPL-MCNC: 7 MG/DL (ref 7–17)
CALCIUM SERPL-MCNC: 9.3 MG/DL (ref 8.7–10.5)
CHLORIDE SERPL-SCNC: 103 MMOL/L (ref 95–110)
CLARITY UR: CLEAR
CO2 SERPL-SCNC: 24 MMOL/L (ref 23–29)
COLOR UR AUTO: YELLOW
CREAT SERPL-MCNC: 0.5 MG/DL (ref 0.5–1.4)
ERYTHROCYTE [DISTWIDTH] IN BLOOD BY AUTOMATED COUNT: 13.3 % (ref 11.5–14.5)
GFR SERPLBLD CREATININE-BSD FMLA CKD-EPI: >60 ML/MIN/1.73/M2
GLUCOSE SERPL-MCNC: 102 MG/DL (ref 70–110)
GLUCOSE UR QL STRIP: NEGATIVE
HCT VFR BLD AUTO: 44.3 % (ref 37–48.5)
HGB BLD-MCNC: 14.5 GM/DL (ref 12–16)
HGB UR QL STRIP: NEGATIVE
IMM GRANULOCYTES # BLD AUTO: 0.04 K/UL (ref 0–0.04)
IMM GRANULOCYTES NFR BLD AUTO: 0.3 % (ref 0–0.5)
KETONES UR QL STRIP: NEGATIVE
LEUKOCYTE ESTERASE UR QL STRIP: NEGATIVE
LYMPHOCYTES # BLD AUTO: 2.76 K/UL (ref 1–4.8)
MCH RBC QN AUTO: 28.2 PG (ref 27–31)
MCHC RBC AUTO-ENTMCNC: 32.7 G/DL (ref 32–36)
MCV RBC AUTO: 86 FL (ref 82–98)
NITRITE UR QL STRIP: NEGATIVE
NUCLEATED RBC (/100WBC) (OHS): 0 /100 WBC
PH UR STRIP: 7 [PH]
PLATELET # BLD AUTO: 346 K/UL (ref 150–450)
PMV BLD AUTO: 11.4 FL (ref 9.2–12.9)
POTASSIUM SERPL-SCNC: 3.3 MMOL/L (ref 3.5–5.1)
PROT SERPL-MCNC: 7.5 GM/DL (ref 6–8.4)
PROT UR QL STRIP: NEGATIVE
RBC # BLD AUTO: 5.14 M/UL (ref 4–5.4)
RELATIVE EOSINOPHIL (OHS): 0.8 %
RELATIVE LYMPHOCYTE (OHS): 19.2 % (ref 18–48)
RELATIVE MONOCYTE (OHS): 6.9 % (ref 4–15)
RELATIVE NEUTROPHIL (OHS): 72.5 % (ref 38–73)
SODIUM SERPL-SCNC: 139 MMOL/L (ref 136–145)
SP GR UR STRIP: 1.02
TROPONIN I SERPL DL<=0.01 NG/ML-MCNC: <0.012 NG/ML (ref 0–0.03)
UROBILINOGEN UR STRIP-ACNC: NEGATIVE EU/DL
WBC # BLD AUTO: 14.35 K/UL (ref 3.9–12.7)

## 2025-04-08 PROCEDURE — 84443 ASSAY THYROID STIM HORMONE: CPT | Mod: ER | Performed by: STUDENT IN AN ORGANIZED HEALTH CARE EDUCATION/TRAINING PROGRAM

## 2025-04-08 PROCEDURE — 80053 COMPREHEN METABOLIC PANEL: CPT | Mod: ER | Performed by: STUDENT IN AN ORGANIZED HEALTH CARE EDUCATION/TRAINING PROGRAM

## 2025-04-08 PROCEDURE — 96374 THER/PROPH/DIAG INJ IV PUSH: CPT | Mod: ER

## 2025-04-08 PROCEDURE — 84484 ASSAY OF TROPONIN QUANT: CPT | Mod: ER | Performed by: STUDENT IN AN ORGANIZED HEALTH CARE EDUCATION/TRAINING PROGRAM

## 2025-04-08 PROCEDURE — 81003 URINALYSIS AUTO W/O SCOPE: CPT | Mod: ER | Performed by: STUDENT IN AN ORGANIZED HEALTH CARE EDUCATION/TRAINING PROGRAM

## 2025-04-08 PROCEDURE — 25000003 PHARM REV CODE 250: Mod: ER | Performed by: STUDENT IN AN ORGANIZED HEALTH CARE EDUCATION/TRAINING PROGRAM

## 2025-04-08 PROCEDURE — 84436 ASSAY OF TOTAL THYROXINE: CPT | Mod: ER | Performed by: STUDENT IN AN ORGANIZED HEALTH CARE EDUCATION/TRAINING PROGRAM

## 2025-04-08 PROCEDURE — 93010 ELECTROCARDIOGRAM REPORT: CPT | Mod: ,,, | Performed by: INTERNAL MEDICINE

## 2025-04-08 PROCEDURE — 93005 ELECTROCARDIOGRAM TRACING: CPT | Mod: ER

## 2025-04-08 PROCEDURE — 99285 EMERGENCY DEPT VISIT HI MDM: CPT | Mod: 25,ER

## 2025-04-08 PROCEDURE — 85025 COMPLETE CBC W/AUTO DIFF WBC: CPT | Mod: ER | Performed by: STUDENT IN AN ORGANIZED HEALTH CARE EDUCATION/TRAINING PROGRAM

## 2025-04-08 RX ORDER — METOPROLOL TARTRATE 1 MG/ML
2.5 INJECTION, SOLUTION INTRAVENOUS
Status: COMPLETED | OUTPATIENT
Start: 2025-04-08 | End: 2025-04-08

## 2025-04-08 RX ORDER — METOPROLOL TARTRATE 25 MG/1
25 TABLET, FILM COATED ORAL
Status: COMPLETED | OUTPATIENT
Start: 2025-04-08 | End: 2025-04-08

## 2025-04-08 RX ADMIN — METOPROLOL TARTRATE 25 MG: 25 TABLET, FILM COATED ORAL at 10:04

## 2025-04-08 RX ADMIN — METOROPROLOL TARTRATE 2.5 MG: 5 INJECTION, SOLUTION INTRAVENOUS at 11:04

## 2025-04-08 RX ADMIN — POTASSIUM BICARBONATE 50 MEQ: 978 TABLET, EFFERVESCENT ORAL at 11:04

## 2025-04-09 VITALS
RESPIRATION RATE: 20 BRPM | WEIGHT: 293 LBS | OXYGEN SATURATION: 99 % | DIASTOLIC BLOOD PRESSURE: 77 MMHG | BODY MASS INDEX: 57.61 KG/M2 | SYSTOLIC BLOOD PRESSURE: 137 MMHG | HEART RATE: 101 BPM | TEMPERATURE: 99 F

## 2025-04-09 PROBLEM — I47.10 SVT (SUPRAVENTRICULAR TACHYCARDIA): Status: ACTIVE | Noted: 2025-04-09

## 2025-04-09 LAB
OHS QRS DURATION: 78 MS
OHS QTC CALCULATION: 463 MS
T4 SERPL-MCNC: 11.1 UG/DL (ref 4.5–11.5)
TROPONIN I SERPL DL<=0.01 NG/ML-MCNC: <0.012 NG/ML (ref 0–0.03)
TSH SERPL-ACNC: 0.59 UIU/ML (ref 0.4–4)

## 2025-04-09 PROCEDURE — 84484 ASSAY OF TROPONIN QUANT: CPT | Mod: ER | Performed by: STUDENT IN AN ORGANIZED HEALTH CARE EDUCATION/TRAINING PROGRAM

## 2025-04-09 RX ORDER — METOPROLOL SUCCINATE 50 MG/1
50 TABLET, EXTENDED RELEASE ORAL DAILY
Qty: 30 TABLET | Refills: 1 | Status: SHIPPED | OUTPATIENT
Start: 2025-04-09 | End: 2025-06-08

## 2025-04-09 NOTE — ED PROVIDER NOTES
Encounter Date: 4/8/2025       History     Chief Complaint   Patient presents with    Chest Pain     Reports to ED c c/o chest pain that started 20 min prior to EMS arrival. Pt was tachycardic upon arrival in 180's and has slowly decreased. Given 4 baby ASA and 1 spray of nitro. Hx of SVT     HPI  25-year-old female history of BMI 57, history of chronic tachycardia with a baseline heart rate ), PCOS, previous SVT per review of records, presents brought in by EMS for chest pain that is now resolving.  She was in her usual state of health at work, when she felt substernal chest pain radiating up to the jaw.  She checked her Apple watch at the time and noted her heart rate to be around 190.  911 was called, and around the time that they arrived states that this spontaneously started getting better per per review of her apple watch at that time her heart rate abruptly broke from 190 and rapidly desaturated back down to around 130.    Review of patient's allergies indicates:   Allergen Reactions    Sulfa (sulfonamide antibiotics) Hives    Crab Diarrhea and Nausea And Vomiting     Aubrey crabs     Past Medical History:   Diagnosis Date    Carpal tunnel syndrome on right     History of bladder infections     as a child    Polycystic ovarian disease     Scoliosis     Vitamin D deficiency      Past Surgical History:   Procedure Laterality Date    ANGIOGRAM, RENAL, UNILATERAL, PEDIATRIC Left     no sedation given    CARPAL TUNNEL RELEASE Right 1/8/2025    Procedure: RELEASE, CARPAL TUNNEL;  Surgeon: Bob Lima Jr., MD;  Location: [facility] OR;  Service: Orthopedics;  Laterality: Right;    TONSILLECTOMY, ADENOIDECTOMY      WISDOM TOOTH EXTRACTION       Family History   Problem Relation Name Age of Onset    Hypertension Father      Hypertension Mother       Social History[1]  Medical surgical family and social history reviewed  Review of Systems  Complete review of systems was conducted and was negative except as per HPI  and as marked  Physical Exam     Initial Vitals [04/08/25 2105]   BP Pulse Resp Temp SpO2   133/86 (!) 127 20 98.9 °F (37.2 °C) 98 %      MAP       --         Physical Exam  Physical  General: Awake, alert, no acute distress  Head: Normocephalic, atraumatic  Neck: Trachea midline, full range of motion, no meningismus  ENT: Atraumatic, Airway Patent  Cardio:  Regular tachycardia, Heart Sounds Normal, Capillary refill normal  Chest: Atraumatic, No respiratory distress no use of accessory muscles to breath, normal rate, sounds even and clear to auscultation  Abdomen: Soft, Nontender, no involuntary guarding, rigidity, or rebound.  Upper Ext: Atraumatic, Inspection normal, no swelling  Lower Ext: Atraumatic, Inspection normal, no swelling  Neuro: No gross cranial nerve abnormality, no lateralization, no gross sensory or motor deficits  ED Course   Procedures  Labs Reviewed   COMPREHENSIVE METABOLIC PANEL - Abnormal       Result Value    Sodium 139      Potassium 3.3 (*)     Chloride 103      CO2 24      Glucose 102      BUN 7      Creatinine 0.5      Calcium 9.3      Protein Total 7.5      Albumin 4.4      Bilirubin Total 0.4      ALP 85      AST 20      ALT 25      Anion Gap 12      eGFR >60     CBC WITH DIFFERENTIAL - Abnormal    WBC 14.35 (*)     RBC 5.14      HGB 14.5      HCT 44.3      MCV 86      MCH 28.2      MCHC 32.7      RDW 13.3      Platelet Count 346      MPV 11.4      Nucleated RBC 0      Neut % 72.5      Lymph % 19.2      Mono % 6.9      Eos % 0.8      Basophil % 0.3      Imm Grans % 0.3      Neut # 10.40 (*)     Lymph # 2.76      Mono # 0.99      Eos # 0.11      Baso # 0.05      Imm Grans # 0.04     TROPONIN I - Normal    Troponin-I <0.012     TROPONIN I - Normal    Troponin-I <0.012     URINALYSIS, REFLEX TO URINE CULTURE - Normal    Color, UA Yellow      Appearance, UA Clear      pH, UA 7.0      Spec Grav UA 1.020      Protein, UA Negative      Glucose, UA Negative      Ketones, UA Negative       Bilirubin, UA Negative      Blood, UA Negative      Nitrites, UA Negative      Urobilinogen, UA Negative      Leukocyte Esterase, UA Negative     TSH - Normal    TSH 0.587     CBC W/ AUTO DIFFERENTIAL    Narrative:     The following orders were created for panel order CBC auto differential.  Procedure                               Abnormality         Status                     ---------                               -----------         ------                     CBC with Differential[[id number]]       Abnormal            Final result                 Please view results for these tests on the individual orders.   T4          Imaging Results              X-Ray Chest AP Portable (Final result)  Result time 04/08/25 22:51:02      Final result by Niik Craig MD (04/08/25 22:51:02)                   Narrative:    EXAM: XR CHEST AP PORTABLE    CLINICAL HISTORY: Chest pain    PRIOR:  04/01/2025    FINDINGS:   No new pulmonary opacity or pleural effusion    IMPRESSION:  Stable chest exam    Finalized on: 4/8/2025 10:51 PM By:  Niki Craig MD  [facility]# [id number]      2025-04-08 22:53:06.471     [facility]                                     Medications   metoprolol tartrate (LOPRESSOR) tablet 25 mg (25 mg Oral Given 4/8/25 2222)   metoprolol injection 2.5 mg (2.5 mg Intravenous Given 4/8/25 2337)   potassium bicarbonate disintegrating tablet 50 mEq (50 mEq Oral Given 4/8/25 2337)     Medical Decision Making  Amount and/or Complexity of Data Reviewed  Labs: ordered.  Radiology: ordered.    Risk  Prescription drug management.       EKG interpretation by me rate 120 rhythm sinus tachycardia, normal axis,  otherwise normal intervals, no ST elevations or depressions.    EKG shows no clinically significant long QT, short QT, Yamileth-Parkinson-White, Brugada, hypertrophic cardiomyopathy, right ventricular arrhythmogenic dysplasia, or any other at electrographic etiology of syncope or sudden cardiac death.    Will evaluate for  acute pathology requiring intervention with appropriate studies, treat symptomatically, and reassess.  Based on history and exam it seems this is a 25-year-old female with a history of chronic tachycardia and paroxysmal SVTs who likely had another SVT tonight that is self-terminated although I do not have access to any exact rhythm strip that can prove it.  History and exam is not consistent with pulmonary embolism.    Specifically, history of both severe hypertension and chronic tachycardia.  She has seen her primary about this multiple times before, and they had written her for nifedipine which improved her blood pressure.  They then told her to take labetalol also, but she has not been doing this as she was concerned it would drop her pressure too low.  She is actively working on improving her body weight, lost 10 lb on Ozempic per her report before no longer qualifying for it on Medicaid, and was intolerant of Mounjaro and recently stopped it.    After consideration of history, exam, and pertinent studies, risk stratification is done as such.    As is standard in the evaluation of chest pain, consideration was given to (but not limited to) EMCs: Aortic dissection, ACS,  and pneumothorax.    Pneumothorax excluded on the basis of Xray/imaging.    Presentation is clinically inconsistent with aortic dissection. ADDRS is 0 (no high risk pain features, high risk exam features, or high risk medical history). Chest x-ray without mediastinal widening. No indication for further evaluation for aortic dissection (risks associated with further evaluation including but not limited to contrast and radiation outweigh benefits).     Heart score is 3 or less, or 4 or less with no known history of CAD. This has been extensively validated in scientific data to represent 30 risk of MACE at 1% or less, indicating risks associated with further ED/inpatient evaluation outweigh benefits and patient is stable for outpatient discharge  with appropriate follow-up.   Hyperthyroidism was considered and excluded.     -----------  Patient seen in the Emergency department, which included consideration and evaluation for life and limb threatening medical conditions including the history, exam, timely review and interpretation of studies.   All labs and imaging ordered for this encountered were reviewed and included in medical decision making.   Additional information for this case was obtained from review of medical records includin previous outpatient care documents that show chronic tachycardia.  :                             Clinical Impression:  Final diagnoses:  [R07.9] Chest pain (Primary)  [R00.0] Chronic tachycardia  [Z86.79] History of supraventricular tachycardia  [I47.10] SVT (supraventricular tachycardia) - suspected          ED Disposition Condition    Discharge           ED Prescriptions       Medication Sig Dispense Start Date End Date Auth. Provider    metoprolol succinate (TOPROL-XL) 50 MG 24 hr tablet Take 1 tablet (50 mg total) by mouth once daily. 30 tablet 2025 Alex Shaw MD          Follow-up Information       Follow up With Specialties Details Why Contact Info    Gary Steele MD Cardiology, Interventional Cardiology   [address] [facility] [place]  [address]  [facility] [address]  [phone]      Gary Steele MD Cardiology, Interventional Cardiology   [address] [facility] [address]  [phone]                   [1]   Social History  Tobacco Use    Smoking status: Never     Passive exposure: Yes    Smokeless tobacco: Never   Substance Use Topics    Alcohol use: Yes     Comment: occ    Drug use: No        Alex Shaw MD  [phone]

## 2025-04-09 NOTE — DISCHARGE INSTRUCTIONS
Follow-up with cardiologist by calling for appointment.  As we discussed, there seems to be 2 separate groups of issues which would benefit from evaluation with Cardiology, although the treatment for them maybe similar.  The 1st is that it appears that you had an arrhythmia today, most likely 1 called a supraventricular tachycardia, caused by a short circuit on the top half of the heart that makes it beat very quickly for a short period of time.  The 2nd is that in terms of long-term health maintenance, you would likely benefit from better control of your heart rate long-term.  You can discuss evaluation for POTS with them as well, although remember this may not change long-term management beyond discussing physical conditioning and hydration.  I do also recommend discussing with the your primary care if there are is any way to modify your GLP 1 prescriptions that you would be able to tolerate it better, such as a reduced dose or half dose.     Continue taking the nifedipine at this time, but do not take labetalol, use the metoprolol instead.  Metoprolol it has a lot more effect on heart rate and suppressing arrhythmias, and less effect on blood pressure.  You were trialed on this in the ER and did very well with it.

## 2025-04-09 NOTE — ED NOTES
Assumed care of pt who was brought in via EMS reporting sudden onset SOB, chest / back pain, palpitations and that her heart rate was 190 on her apple watch 30 mins PTA. She currently denies any pain, appears anxious and crying, but in no distress. Emotional support was provided. She has been having these episodes and is pending a cardiology consult. Will cont to monitor

## 2025-04-10 DIAGNOSIS — I47.10 SVT (SUPRAVENTRICULAR TACHYCARDIA): Primary | ICD-10-CM

## 2025-04-10 DIAGNOSIS — R07.9 CHEST PAIN, UNSPECIFIED TYPE: ICD-10-CM

## 2025-04-10 DIAGNOSIS — E78.5 HYPERLIPIDEMIA, UNSPECIFIED HYPERLIPIDEMIA TYPE: ICD-10-CM

## 2025-04-14 ENCOUNTER — OFFICE VISIT (OUTPATIENT)
Dept: CARDIOLOGY | Facility: CLINIC | Age: 25
End: 2025-04-14
Payer: MEDICAID

## 2025-04-14 VITALS
SYSTOLIC BLOOD PRESSURE: 109 MMHG | WEIGHT: 293 LBS | DIASTOLIC BLOOD PRESSURE: 74 MMHG | HEIGHT: 65 IN | BODY MASS INDEX: 48.82 KG/M2 | HEART RATE: 87 BPM

## 2025-04-14 DIAGNOSIS — R07.9 CHEST PAIN: ICD-10-CM

## 2025-04-14 DIAGNOSIS — G90.A POTS (POSTURAL ORTHOSTATIC TACHYCARDIA SYNDROME): Primary | ICD-10-CM

## 2025-04-14 DIAGNOSIS — R00.0 TACHYCARDIA: ICD-10-CM

## 2025-04-14 DIAGNOSIS — I47.10 SVT (SUPRAVENTRICULAR TACHYCARDIA): ICD-10-CM

## 2025-04-14 PROCEDURE — 1160F RVW MEDS BY RX/DR IN RCRD: CPT | Mod: CPTII,,, | Performed by: INTERNAL MEDICINE

## 2025-04-14 PROCEDURE — 3074F SYST BP LT 130 MM HG: CPT | Mod: CPTII,,, | Performed by: INTERNAL MEDICINE

## 2025-04-14 PROCEDURE — 3078F DIAST BP <80 MM HG: CPT | Mod: CPTII,,, | Performed by: INTERNAL MEDICINE

## 2025-04-14 PROCEDURE — 1159F MED LIST DOCD IN RCRD: CPT | Mod: CPTII,,, | Performed by: INTERNAL MEDICINE

## 2025-04-14 PROCEDURE — 99204 OFFICE O/P NEW MOD 45 MIN: CPT | Mod: S$PBB,,, | Performed by: INTERNAL MEDICINE

## 2025-04-14 PROCEDURE — 99213 OFFICE O/P EST LOW 20 MIN: CPT | Mod: PBBFAC,PO | Performed by: INTERNAL MEDICINE

## 2025-04-14 PROCEDURE — 3008F BODY MASS INDEX DOCD: CPT | Mod: CPTII,,, | Performed by: INTERNAL MEDICINE

## 2025-04-14 PROCEDURE — 99999 PR PBB SHADOW E&M-EST. PATIENT-LVL III: CPT | Mod: PBBFAC,,, | Performed by: INTERNAL MEDICINE

## 2025-04-14 RX ORDER — NIFEDIPINE 30 MG/1
30 TABLET, EXTENDED RELEASE ORAL DAILY
COMMUNITY
Start: 2025-03-28 | End: 2025-04-14

## 2025-04-14 NOTE — PROGRESS NOTES
"Subjective:   @Patient ID:  Nola Rooney is a 25 y.o. female who presents for evaluation of Tachycardia      HPI:   April 2025: Initial evaluation with me.  She reports palpitation it has been getting worse for the last few weeks.  Symptoms are much worse when she stands up and associated with dizziness and nausea.  No syncope reported.  Symptoms prompted ER evaluation and workup was unremarkable.  Was started on Toprol 50 mg daily which is helping her heart rate but she is still having dizziness and nausea.  Also she is on nifedipine 30 mg.  Has Apple I watch that I have reviewed which is showing sinus rhythm and sinus tachycardia    SH: No tobacco abuse. No etoh abuse    FH:  No premature CAD    PMH:   Past Medical History:   Diagnosis Date    Carpal tunnel syndrome on right     History of bladder infections     as a child    Polycystic ovarian disease     Scoliosis     Vitamin D deficiency           Prior cardiovascular  Hx  --------------------------------      - EKG April 2025 sinus rhythm with no ischemic changes           The ASCVD Risk score (Manish CASTRO, et al., 2019) failed to calculate for the following reasons:    The 2019 ASCVD risk score is only valid for ages 40 to 79      Patient Active Problem List    Diagnosis Date Noted    SVT (supraventricular tachycardia) 04/09/2025    Carpal tunnel syndrome of right wrist 01/08/2025    Right knee pain 06/28/2023                    LAST HbA1c  Lab Results   Component Value Date    HGBA1C 5.0 09/23/2024       Lipid panel  No results found for: "CHOL"  No results found for: "HDL"  No results found for: "LDLCALC"  No results found for: "TRIG"  No results found for: "CHOLHDL"         Review of Systems   Constitutional: Negative for chills and fever.   HENT:  Negative for hearing loss and nosebleeds.    Eyes:  Negative for blurred vision.   Cardiovascular:         As in HPI    Respiratory:  Negative for cough, hemoptysis and shortness of breath.    Endocrine: " Negative for cold intolerance and polyuria.   Hematologic/Lymphatic: Negative for bleeding problem.   Skin:  Negative for itching.   Musculoskeletal:  Negative for falls.   Gastrointestinal:  Negative for abdominal pain and hematochezia.   Genitourinary:  Negative for hematuria.   Neurological:  Negative for dizziness and loss of balance.   Psychiatric/Behavioral:  Negative for altered mental status and depression.        Objective:   Physical Exam  Constitutional:       Appearance: She is well-developed.   HENT:      Head: Normocephalic and atraumatic.   Eyes:      Conjunctiva/sclera: Conjunctivae normal.   Neck:      Vascular: No carotid bruit or JVD.   Cardiovascular:      Rate and Rhythm: Normal rate and regular rhythm.      Pulses:           Carotid pulses are 2+ on the right side and 2+ on the left side.       Radial pulses are 2+ on the right side and 2+ on the left side.      Heart sounds: Normal heart sounds. No murmur heard.     No friction rub. No gallop.   Pulmonary:      Effort: Pulmonary effort is normal. No respiratory distress.      Breath sounds: Normal breath sounds. No stridor. No wheezing.   Abdominal:      General: Abdomen is flat.      Palpations: Abdomen is soft.   Musculoskeletal:      Cervical back: Neck supple.      Right lower leg: No edema.      Left lower leg: No edema.   Skin:     General: Skin is warm and dry.   Neurological:      Mental Status: She is alert and oriented to person, place, and time.   Psychiatric:         Behavior: Behavior normal.         Assessment:     1. POTS (postural orthostatic tachycardia syndrome)    2. Chest pain    3. SVT (supraventricular tachycardia)    4. Tachycardia        Plan:   I did not see any evidence of SVT at this time.  From the Apple I watch EKG appears to be sinus tachycardia    We will check Holter 48 hours    We will check tilt-table test for POTS evaluation.  Hydration thoroughly discussed.  Increase fluid intake    Continue metoprolol 50 mg  daily  Stop nifedipine  2D echocardiogram      Weight loss strongly encouraged      Pertinent cardiac images and EKG reviewed independently.    Continue with current medical plan and lifestyle changes.  Return sooner for concerns or questions. If symptoms persist go to the ED  I have reviewed all pertinent data including patient's medical history in detail and updated the computerized patient record.     Orders Placed This Encounter   Procedures    Tilt table     Standing Status:   Future     Expiration Date:   4/14/2026     Release to patient:   Immediate    Holter monitor - 48 hour     Standing Status:   Future     Expiration Date:   4/14/2026    Echo     Standing Status:   Future     Expiration Date:   4/14/2026     Release to patient:   Immediate       Follow up as scheduled.   -In today's visit, at least 4 established conditions that pose a risk to life or bodily function have been addressed and the conditions are severe.    -In today's visit, monitoring for drug toxicity was accomplished.     Greater than 50% of the visit was spent counseling, educating, and coordinating the care of the patient.     She expressed verbal understanding and agreed with the plan    Patient's Medications   New Prescriptions    No medications on file   Previous Medications    ERGOCALCIFEROL (ERGOCALCIFEROL) 50,000 UNIT CAP    Take 50,000 Units by mouth every 7 days. Weekly Thursday; instructed to hold for sx    GABAPENTIN (NEURONTIN) 100 MG CAPSULE    Take 1 capsule (100 mg total) by mouth 3 (three) times daily.    MEDROXYPROGESTERONE (PROVERA) 10 MG TABLET    Take 10 mg by mouth every 30 days. Every other month    METOPROLOL SUCCINATE (TOPROL-XL) 50 MG 24 HR TABLET    Take 1 tablet (50 mg total) by mouth once daily.    ONDANSETRON (ZOFRAN) 4 MG TABLET    Take 1 tablet (4 mg total) by mouth every 6 (six) hours.    ONDANSETRON (ZOFRAN-ODT) 4 MG TBDL    Take 1 tablet (4 mg total) by mouth every 6 (six) hours as needed.    PROMETHAZINE  (PHENERGAN) 25 MG SUPPOSITORY    Place 1 suppository (25 mg total) rectally every 6 (six) hours as needed for Nausea.   Modified Medications    No medications on file   Discontinued Medications    NIFEDIPINE (PROCARDIA-XL) 30 MG (OSM) 24 HR TABLET    Take 30 mg by mouth once daily.        Gary Steele MD, [facility], [facility]  Heart and Vascular Interventional Cardiology

## 2025-04-17 ENCOUNTER — PATIENT OUTREACH (OUTPATIENT)
Facility: OTHER | Age: 25
End: 2025-04-17
Payer: MEDICAID

## 2025-04-17 NOTE — PROGRESS NOTES
I spoke with patient. She stated that she is doing good and that the medicine given has lowered her heart rate. She denied need assistance with establishing a primary dr, food, transportation, or housing.

## 2025-04-19 ENCOUNTER — HOSPITAL ENCOUNTER (EMERGENCY)
Facility: HOSPITAL | Age: 25
Discharge: HOME | End: 2025-04-19
Attending: STUDENT IN AN ORGANIZED HEALTH CARE EDUCATION/TRAINING PROGRAM
Payer: MEDICAID

## 2025-04-19 VITALS
BODY MASS INDEX: 57.52 KG/M2 | OXYGEN SATURATION: 99 % | TEMPERATURE: 98 F | HEART RATE: 108 BPM | SYSTOLIC BLOOD PRESSURE: 125 MMHG | RESPIRATION RATE: 15 BRPM | HEIGHT: 60 IN | DIASTOLIC BLOOD PRESSURE: 73 MMHG | WEIGHT: 293 LBS

## 2025-04-19 DIAGNOSIS — R53.83 FATIGUE: ICD-10-CM

## 2025-04-19 DIAGNOSIS — R00.0 TACHYCARDIA: Primary | ICD-10-CM

## 2025-04-19 LAB
ABSOLUTE EOSINOPHIL (OHS): 0.15 K/UL
ABSOLUTE MONOCYTE (OHS): 0.67 K/UL (ref 0.3–1)
ABSOLUTE NEUTROPHIL COUNT (OHS): 9.24 K/UL (ref 1.8–7.7)
ALBUMIN SERPL BCP-MCNC: 3.9 G/DL (ref 3.5–5.2)
ALP SERPL-CCNC: 74 UNIT/L (ref 38–126)
ALT SERPL W/O P-5'-P-CCNC: 39 UNIT/L (ref 10–44)
ANION GAP (OHS): 9 MMOL/L (ref 8–16)
AST SERPL-CCNC: 26 UNIT/L (ref 15–46)
B-HCG UR QL: NEGATIVE
BASOPHILS # BLD AUTO: 0.03 K/UL
BASOPHILS NFR BLD AUTO: 0.2 %
BILIRUB SERPL-MCNC: 0.3 MG/DL (ref 0.1–1)
BUN SERPL-MCNC: 12 MG/DL (ref 7–17)
CALCIUM SERPL-MCNC: 9 MG/DL (ref 8.7–10.5)
CHLORIDE SERPL-SCNC: 105 MMOL/L (ref 95–110)
CO2 SERPL-SCNC: 24 MMOL/L (ref 23–29)
CREAT SERPL-MCNC: 0.5 MG/DL (ref 0.5–1.4)
CTP QC/QA: YES
D DIMER PPP IA.FEU-MCNC: <0.19 MG/L FEU
ERYTHROCYTE [DISTWIDTH] IN BLOOD BY AUTOMATED COUNT: 14.1 % (ref 11.5–14.5)
GFR SERPLBLD CREATININE-BSD FMLA CKD-EPI: >60 ML/MIN/1.73/M2
GLUCOSE SERPL-MCNC: 103 MG/DL (ref 70–110)
HCT VFR BLD AUTO: 40 % (ref 37–48.5)
HGB BLD-MCNC: 13 GM/DL (ref 12–16)
IMM GRANULOCYTES # BLD AUTO: 0.03 K/UL (ref 0–0.04)
IMM GRANULOCYTES NFR BLD AUTO: 0.2 % (ref 0–0.5)
LYMPHOCYTES # BLD AUTO: 2.55 K/UL (ref 1–4.8)
MCH RBC QN AUTO: 28.4 PG (ref 27–31)
MCHC RBC AUTO-ENTMCNC: 32.5 G/DL (ref 32–36)
MCV RBC AUTO: 87 FL (ref 82–98)
NUCLEATED RBC (/100WBC) (OHS): 0 /100 WBC
PLATELET # BLD AUTO: 294 K/UL (ref 150–450)
PMV BLD AUTO: 12 FL (ref 9.2–12.9)
POTASSIUM SERPL-SCNC: 3.9 MMOL/L (ref 3.5–5.1)
PROT SERPL-MCNC: 6.7 GM/DL (ref 6–8.4)
RBC # BLD AUTO: 4.58 M/UL (ref 4–5.4)
RELATIVE EOSINOPHIL (OHS): 1.2 %
RELATIVE LYMPHOCYTE (OHS): 20.1 % (ref 18–48)
RELATIVE MONOCYTE (OHS): 5.3 % (ref 4–15)
RELATIVE NEUTROPHIL (OHS): 73 % (ref 38–73)
SODIUM SERPL-SCNC: 138 MMOL/L (ref 136–145)
TROPONIN I SERPL DL<=0.01 NG/ML-MCNC: <0.012 NG/ML (ref 0–0.03)
WBC # BLD AUTO: 12.67 K/UL (ref 3.9–12.7)

## 2025-04-19 PROCEDURE — 85025 COMPLETE CBC W/AUTO DIFF WBC: CPT | Mod: ER | Performed by: STUDENT IN AN ORGANIZED HEALTH CARE EDUCATION/TRAINING PROGRAM

## 2025-04-19 PROCEDURE — 85379 FIBRIN DEGRADATION QUANT: CPT | Mod: ER | Performed by: STUDENT IN AN ORGANIZED HEALTH CARE EDUCATION/TRAINING PROGRAM

## 2025-04-19 PROCEDURE — 81025 URINE PREGNANCY TEST: CPT | Mod: ER | Performed by: STUDENT IN AN ORGANIZED HEALTH CARE EDUCATION/TRAINING PROGRAM

## 2025-04-19 PROCEDURE — 99900035 HC TECH TIME PER 15 MIN (STAT): Mod: ER

## 2025-04-19 PROCEDURE — 80053 COMPREHEN METABOLIC PANEL: CPT | Mod: ER | Performed by: STUDENT IN AN ORGANIZED HEALTH CARE EDUCATION/TRAINING PROGRAM

## 2025-04-19 PROCEDURE — 99284 EMERGENCY DEPT VISIT MOD MDM: CPT | Mod: 25,ER

## 2025-04-19 PROCEDURE — 84484 ASSAY OF TROPONIN QUANT: CPT | Mod: ER | Performed by: STUDENT IN AN ORGANIZED HEALTH CARE EDUCATION/TRAINING PROGRAM

## 2025-04-19 PROCEDURE — 94760 N-INVAS EAR/PLS OXIMETRY 1: CPT | Mod: ER

## 2025-04-19 PROCEDURE — 93010 ELECTROCARDIOGRAM REPORT: CPT | Mod: ,,, | Performed by: STUDENT IN AN ORGANIZED HEALTH CARE EDUCATION/TRAINING PROGRAM

## 2025-04-19 PROCEDURE — 93005 ELECTROCARDIOGRAM TRACING: CPT | Mod: ER

## 2025-04-19 NOTE — Clinical Note
"Nola "Hernán Rooney was seen and treated in our emergency department on 4/19/2025.  She may return to work on 04/28/2025.       If you have any questions or concerns, please don't hesitate to call.      Seble Wynne, DO"

## 2025-04-20 NOTE — ED NOTES
Patient brought to room 8 from the waiting room lobby.  Assumed care of this patient.  Patient placed in a hospital gown and placed on continuous cardiac monitoring.  Patient denies having any chest pain at this time.   Call light within reach.  Plan of care ongoing.

## 2025-04-20 NOTE — ED NOTES
Patient reports a hx of POTS and is experiencing dizziness and tachycardia when standing. Patient reports cardiology has prescribed Metoprolol 50 mg ER, which she takes once daily. She reports that she was informed by cardiology that no further medication adjustments can be made until she undergoes a tilt table test, which is scheduled for August. Patient expresses concern regarding her symptoms and requests further evaluation while awaiting testing.

## 2025-04-20 NOTE — ED PROVIDER NOTES
NAME:  Nola Rooney  CSN:     [id number]  MRN:    [id number]  ADMIT DATE: 4/19/2025        eMERGENCY dEPARTMENT eNCOUnter    CHIEF COMPLAINT    Chief Complaint   Patient presents with    Tachycardia     Pt reports  and 150. Pt seeing cardio, states MD said it may be POTS. Pt also reports dizziness and fatigue that started 1 hour ago.        HPI    Nola Rooney is a 25 y.o. female with a past medical history of  has a past medical history of Carpal tunnel syndrome on right, History of bladder infections, Polycystic ovarian disease, Scoliosis, and Vitamin D deficiency.     she presents to the ED due to concern for tachycardia.  She states she was running around at work, felt lightheaded and noted that her heart rate was 150.  Subsequently left work to get evaluated.  States that she notices that her resting heart rate is in the 80s she will just lie a rollover in bed and it will go up to the 120s.  Notes that her heart rate also adjust with change in sitting to standing.  Is following closely with cardiology and has echo and Holter monitor placement in 2 days.  She denies any increased stress or anxiety.  Taking metoprolol as prescribed.       HPI       PAST MEDICAL HISTORY  Past Medical History:   Diagnosis Date    Carpal tunnel syndrome on right     History of bladder infections     as a child    Polycystic ovarian disease     Scoliosis     Vitamin D deficiency        SURGICAL HISTORY    Past Surgical History:   Procedure Laterality Date    ANGIOGRAM, RENAL, UNILATERAL, PEDIATRIC Left     no sedation given    CARPAL TUNNEL RELEASE Right 1/8/2025    Procedure: RELEASE, CARPAL TUNNEL;  Surgeon: Bob Lima Jr., MD;  Location: [facility];  Service: Orthopedics;  Laterality: Right;    TONSILLECTOMY, ADENOIDECTOMY      WISDOM TOOTH EXTRACTION         FAMILY HISTORY    Family History   Problem Relation Name Age of Onset    Hypertension Father      Hypertension Mother         SOCIAL HISTORY    Social History      Socioeconomic History    Marital status: Single   Tobacco Use    Smoking status: Never     Passive exposure: Yes    Smokeless tobacco: Never   Substance and Sexual Activity    Alcohol use: Yes     Comment: occ    Drug use: No    Sexual activity: Yes     Partners: Male     Social Drivers of Health     Financial Resource Strain: Low Risk  (4/10/2025)    Overall Financial Resource Strain (CARDIA)     Difficulty of Paying Living Expenses: Not hard at all   Food Insecurity: No Food Insecurity (4/10/2025)    Hunger Vital Sign     Worried About Running Out of Food in the Last Year: Never true     Ran Out of Food in the Last Year: Never true   Transportation Needs: No Transportation Needs (4/10/2025)    PRAPARE - Transportation     Lack of Transportation (Medical): No     Lack of Transportation (Non-Medical): No   Physical Activity: Insufficiently Active (4/10/2025)    Exercise Vital Sign     Days of Exercise per Week: 3 days     Minutes of Exercise per Session: 30 min   Stress: No Stress Concern Present (4/10/2025)    [employer] [place] [employer] - Occupational Stress Questionnaire     Feeling of Stress : Only a little   Housing Stability: Low Risk  (4/10/2025)    Housing Stability Vital Sign     Unable to Pay for Housing in the Last Year: No     Number of Times Moved in the Last Year: 0     Homeless in the Last Year: No       MEDICATIONS  Current Outpatient Medications   Medication Instructions    ergocalciferol (ERGOCALCIFEROL) 50,000 Units, Every 7 days    gabapentin (NEURONTIN) 100 mg, Oral, 3 times daily    medroxyPROGESTERone (PROVERA) 10 mg, Every 30 days    metoprolol succinate (TOPROL-XL) 50 mg, Oral, Daily    ondansetron (ZOFRAN) 4 mg, Oral, Every 6 hours    ondansetron (ZOFRAN-ODT) 4 mg, Oral, Every 6 hours PRN    promethazine (PHENERGAN) 25 mg, Rectal, Every 6 hours PRN       ALLERGIES    Review of patient's allergies indicates:   Allergen Reactions    Sulfa (sulfonamide antibiotics) Hives     Crab Diarrhea and Nausea And Vomiting     King misty         REVIEW OF SYSTEMS   Review of Systems       PHYSICAL EXAM    Reviewed Triage Note    VITAL SIGNS:   ED Triage Vitals [04/19/25 2040]   Encounter Vitals Group      /83      Systolic BP Percentile       Diastolic BP Percentile       Pulse 99      Resp 19      Temp 98.3 °F (36.8 °C)      Temp src       SpO2 100 %      Weight 295 lb      Height 5'      Head Circumference       Peak Flow       Pain Score       Pain Loc       Pain Education       Exclude from Growth Chart        Patient Vitals for the past 24 hrs:   BP Temp Pulse Resp SpO2 Height Weight   04/19/25 2251 125/73 -- 108 15 99 % -- --   04/19/25 2138 129/70 -- 95 16 100 % -- --   04/19/25 2040 118/83 98.3 °F (36.8 °C) 99 19 100 % 5' (1.524 m) 133.8 kg (295 lb)       Physical Exam    Nursing note and vitals reviewed.  Constitutional: She appears well-developed and well-nourished.   HENT:   Head: Normocephalic and atraumatic.   Eyes: EOM are normal. Pupils are equal, round, and reactive to light.   Neck: Neck supple.   Normal range of motion.  Cardiovascular:  Regular rhythm.   Tachycardia present.         Pulmonary/Chest: Breath sounds normal. No respiratory distress.   Abdominal: Abdomen is soft. There is no abdominal tenderness.   Musculoskeletal:         General: Normal range of motion.      Cervical back: Normal range of motion and neck supple.     Neurological: She is alert and oriented to person, place, and time.   Skin: Skin is warm and dry.   Psychiatric:   Anxious, tearful              EKG     Interpreted by EM physician if performed:               LABS  Pertinent labs reviewed. (See chart for details)   Labs Reviewed   CBC WITH DIFFERENTIAL - Abnormal       Result Value    WBC 12.67      RBC 4.58      HGB 13.0      HCT 40.0      MCV 87      MCH 28.4      MCHC 32.5      RDW 14.1      Platelet Count 294      MPV 12.0      Nucleated RBC 0      Neut % 73.0      Lymph % 20.1      Mono % 5.3       Eos % 1.2      Basophil % 0.2      Imm Grans % 0.2      Neut # 9.24 (*)     Lymph # 2.55      Mono # 0.67      Eos # 0.15      Baso # 0.03      Imm Grans # 0.03     COMPREHENSIVE METABOLIC PANEL - Normal    Sodium 138      Potassium 3.9      Chloride 105      CO2 24      Glucose 103      BUN 12      Creatinine 0.5      Calcium 9.0      Protein Total 6.7      Albumin 3.9      Bilirubin Total 0.3      ALP 74      AST 26      ALT 39      Anion Gap 9      eGFR >60     TROPONIN I - Normal    Troponin-I <0.012     D DIMER, QUANTITATIVE - Normal    D-Dimer <0.19     CBC W/ AUTO DIFFERENTIAL    Narrative:     The following orders were created for panel order CBC auto differential.  Procedure                               Abnormality         Status                     ---------                               -----------         ------                     CBC with Differential[[id number]]       Abnormal            Final result                 Please view results for these tests on the individual orders.   POCT URINE PREGNANCY    POC Preg Test, Ur Negative       Acceptable Yes           RADIOLOGY          Imaging Results    None           PROCEDURES    Procedures      ED COURSE & MEDICAL DECISION MAKING    Pertinent Labs & Imaging studies reviewed. (See chart for details and specific orders.)          Summary of review of records:   Following closely with cardiology with office visit on April 14th with concern for possible POTS.  Echo and Holter monitor ordered as well as tilt-table test.  Nifedipine discontinued.    Medical Decision Making  Amount and/or Complexity of Data Reviewed  Labs: ordered. Decision-making details documented in ED Course.  ECG/medicine tests:  Decision-making details documented in ED Course.      Nola Rooney is a 25 y.o. female who presents for intermittent tachycardia and lightheadedness.  States she often feels as if she is going to pass out over the last month.  Did not pass  out today or recently.    Differential includes but is not limited to dysrhythmia, sinus tachycardia, dehydration, clinically low suspicion for ACS, pulmonary embolism underlying infection.            Medications - No data to display    ED Course as of 04/20/25 0527   Sat Apr 19, 2025 2125 EKG 12-lead  Sinus tachycardia at a rate of 104.  No ST elevation or depression noted.  No evidence of ischemia. [HL]   2246 hCG Qualitative, Urine: Negative [HL]   2303 Comprehensive metabolic panel  No acute abnormalities  [HL]   2304 CBC auto differential(!)  No acute abnormalities  [HL]   2309 D-Dimer: <0.19 [HL]   2309 Troponin I: <0.012  within normal limits  [HL]      ED Course User Index  [HL] Seble Wynne,      No acute emergent medical condition has been identified. The patient appears to be low risk for an emergent medical condition is appropriate for discharge with outpatient f/u as detailed in discharge instructions for reevaluation and possible continued outpatient workup and management. I have discussed the workup with the patient, who has verbalized understanding of the plan and need for outpatient follow-up.  This evaluation does not preclude the development of an emergent condition so in addition, I have advised the patient that they can return to the ED at any time with worsening or change of their symptoms, or with any other medical complaint.           FINAL IMPRESSION    Final diagnoses:  [R53.83] Fatigue  [R00.0] Tachycardia (Primary)       DISPOSITION  Patient discharged in stable condition        ED Prescriptions    None       Follow-up Information       Follow up With Specialties Details Why Contact Info    Gary Steele MD Cardiology, Interventional Cardiology Schedule an appointment as soon as possible for a visit   [address]  [address]  [facility] [id number] [phone]      [facility] - Emergency Dept Emergency Medicine  As needed, If symptoms worsen [address] [facility]  Emergency  [facility] [address]  [phone]              DISCLAIMER: This note was prepared with [employer] voice recognition transcription software. Garbled syntax, mangled pronouns, and other bizarre constructions may be attributed to that software system.             Seble Wynne,   04/20/25 0531

## 2025-04-21 ENCOUNTER — HOSPITAL ENCOUNTER (OUTPATIENT)
Dept: CARDIOLOGY | Facility: HOSPITAL | Age: 25
Discharge: HOME | End: 2025-04-21
Attending: INTERNAL MEDICINE
Payer: MEDICAID

## 2025-04-21 VITALS — WEIGHT: 293 LBS | HEIGHT: 60 IN | BODY MASS INDEX: 57.52 KG/M2

## 2025-04-21 DIAGNOSIS — R00.0 TACHYCARDIA: ICD-10-CM

## 2025-04-21 DIAGNOSIS — R07.9 CHEST PAIN: ICD-10-CM

## 2025-04-21 LAB
OHS QRS DURATION: 74 MS
OHS QTC CALCULATION: 457 MS

## 2025-04-21 PROCEDURE — C8929 TTE W OR WO FOL WCON,DOPPLER: HCPCS | Mod: PN

## 2025-04-21 PROCEDURE — 93226 XTRNL ECG REC<48 HR SCAN A/R: CPT | Mod: PN

## 2025-04-21 PROCEDURE — 25500020 PHARM REV CODE 255: Mod: PN | Performed by: INTERNAL MEDICINE

## 2025-04-21 PROCEDURE — 93306 TTE W/DOPPLER COMPLETE: CPT | Mod: 26,,, | Performed by: INTERNAL MEDICINE

## 2025-04-21 RX ADMIN — HUMAN ALBUMIN MICROSPHERES AND PERFLUTREN 0.11 MG: 10; .22 INJECTION, SOLUTION INTRAVENOUS at 04:04

## 2025-04-22 ENCOUNTER — RESULTS FOLLOW-UP (OUTPATIENT)
Dept: CARDIOLOGY | Facility: CLINIC | Age: 25
End: 2025-04-22

## 2025-04-22 ENCOUNTER — PATIENT MESSAGE (OUTPATIENT)
Dept: CARDIOLOGY | Facility: CLINIC | Age: 25
End: 2025-04-22
Payer: MEDICAID

## 2025-04-22 LAB
ASCENDING AORTA: 2.63 CM
AV INDEX (PROSTH): 0.9
AV MEAN GRADIENT: 4 MMHG
AV PEAK GRADIENT: 7 MMHG
AV VALVE AREA BY VELOCITY RATIO: 3.5 CM²
AV VALVE AREA: 3.4 CM²
AV VELOCITY RATIO: 0.92
BSA FOR ECHO PROCEDURE: 2.38 M2
CV ECHO LV RWT: 0.37 CM
DOP CALC AO PEAK VEL: 1.3 M/S
DOP CALC AO VTI: 24.5 CM
DOP CALC LVOT AREA: 3.8 CM2
DOP CALC LVOT DIAMETER: 2.2 CM
DOP CALC LVOT PEAK VEL: 1.2 M/S
DOP CALC LVOT STROKE VOLUME: 83.6 CM3
DOP CALC MV VTI: 26.7 CM
DOP CALCLVOT PEAK VEL VTI: 22 CM
E WAVE DECELERATION TIME: 198 MSEC
E/A RATIO: 2.02
E/E' RATIO: 6 M/S
ECHO LV POSTERIOR WALL: 0.9 CM (ref 0.6–1.1)
FRACTIONAL SHORTENING: 30.6 % (ref 28–44)
INTERVENTRICULAR SEPTUM: 0.9 CM (ref 0.6–1.1)
IVC DIAMETER: 1.45 CM
LEFT ATRIUM AREA SYSTOLIC (APICAL 2 CHAMBER): 17.02 CM2
LEFT ATRIUM AREA SYSTOLIC (APICAL 4 CHAMBER): 17.02 CM2
LEFT ATRIUM VOLUME INDEX MOD: 21 ML/M2
LEFT ATRIUM VOLUME MOD: 46 ML
LEFT INTERNAL DIMENSION IN SYSTOLE: 3.4 CM (ref 2.1–4)
LEFT VENTRICLE DIASTOLIC VOLUME INDEX: 50.91 ML/M2
LEFT VENTRICLE DIASTOLIC VOLUME: 112 ML
LEFT VENTRICLE END SYSTOLIC VOLUME APICAL 2 CHAMBER: 47.55 ML
LEFT VENTRICLE END SYSTOLIC VOLUME APICAL 4 CHAMBER: 41.12 ML
LEFT VENTRICLE MASS INDEX: 69.5 G/M2
LEFT VENTRICLE SYSTOLIC VOLUME INDEX: 20.9 ML/M2
LEFT VENTRICLE SYSTOLIC VOLUME: 46 ML
LEFT VENTRICULAR INTERNAL DIMENSION IN DIASTOLE: 4.9 CM (ref 3.5–6)
LEFT VENTRICULAR MASS: 153 G
LV LATERAL E/E' RATIO: 5.7 M/S
LV SEPTAL E/E' RATIO: 6.8 M/S
LVED V (TEICH): 111.85 ML
LVES V (TEICH): 46.05 ML
LVOT MG: 2.69 MMHG
LVOT MV: 0.75 CM/S
MV MEAN GRADIENT: 1 MMHG
MV PEAK A VEL: 0.54 M/S
MV PEAK E VEL: 1.09 M/S
MV PEAK GRADIENT: 4 MMHG
MV STENOSIS PRESSURE HALF TIME: 57.41 MS
MV VALVE AREA BY CONTINUITY EQUATION: 3.13 CM2
MV VALVE AREA P 1/2 METHOD: 3.83 CM2
OHS CV RV/LV RATIO: 0.65 CM
PV PEAK GRADIENT: 5 MMHG
PV PEAK VELOCITY: 1.12 M/S
RA PRESSURE ESTIMATED: 3 MMHG
RA VOL SYS: 31.69 ML
RIGHT ATRIAL AREA: 13.1 CM2
RIGHT ATRIUM VOLUME AREA LENGTH APICAL 4 CHAMBER: 30.9 ML
RIGHT VENTRICLE DIASTOLIC BASEL DIMENSION: 3.2 CM
RV TISSUE DOPPLER FREE WALL SYSTOLIC VELOCITY 1 (APICAL 4 CHAMBER VIEW): 15.33 CM/S
SINUS: 2.65 CM
STJ: 2.34 CM
TDI LATERAL: 0.19 M/S
TDI SEPTAL: 0.16 M/S
TDI: 0.18 M/S
TRICUSPID ANNULAR PLANE SYSTOLIC EXCURSION: 2.02 CM
Z-SCORE OF LEFT VENTRICULAR DIMENSION IN END DIASTOLE: -4.27
Z-SCORE OF LEFT VENTRICULAR DIMENSION IN END SYSTOLE: -2.33

## 2025-04-22 NOTE — PROGRESS NOTES
Your echocardiogram results is okay.  Normal heart pumping function.  Overall no major abnormalities.  We will discuss in detail during next appointment    Sincerely,  Gary Steele MD.   Interventional Cardiologist  [facility] [place]

## 2025-04-29 ENCOUNTER — PATIENT MESSAGE (OUTPATIENT)
Dept: CARDIOLOGY | Facility: CLINIC | Age: 25
End: 2025-04-29
Payer: MEDICAID

## 2025-05-06 ENCOUNTER — PATIENT MESSAGE (OUTPATIENT)
Dept: CARDIOLOGY | Facility: CLINIC | Age: 25
End: 2025-05-06
Payer: MEDICAID

## 2025-05-07 RX ORDER — METOPROLOL SUCCINATE 50 MG/1
50 TABLET, EXTENDED RELEASE ORAL DAILY
Qty: 90 TABLET | Refills: 1 | Status: SHIPPED | OUTPATIENT
Start: 2025-05-07 | End: 2025-07-06

## 2025-05-19 ENCOUNTER — PATIENT MESSAGE (OUTPATIENT)
Dept: CARDIOLOGY | Facility: CLINIC | Age: 25
End: 2025-05-19
Payer: MEDICAID

## 2025-05-19 ENCOUNTER — HOSPITAL ENCOUNTER (EMERGENCY)
Facility: HOSPITAL | Age: 25
Discharge: HOME | End: 2025-05-19
Attending: EMERGENCY MEDICINE
Payer: MEDICAID

## 2025-05-19 VITALS
HEIGHT: 60 IN | TEMPERATURE: 99 F | SYSTOLIC BLOOD PRESSURE: 125 MMHG | RESPIRATION RATE: 18 BRPM | BODY MASS INDEX: 55.95 KG/M2 | HEART RATE: 117 BPM | DIASTOLIC BLOOD PRESSURE: 79 MMHG | OXYGEN SATURATION: 100 % | WEIGHT: 285 LBS

## 2025-05-19 DIAGNOSIS — R07.9 CHEST PAIN: ICD-10-CM

## 2025-05-19 LAB
OHS QRS DURATION: 72 MS
OHS QTC CALCULATION: 463 MS

## 2025-05-19 PROCEDURE — 93010 ELECTROCARDIOGRAM REPORT: CPT | Mod: ,,, | Performed by: STUDENT IN AN ORGANIZED HEALTH CARE EDUCATION/TRAINING PROGRAM

## 2025-05-19 PROCEDURE — 25000003 PHARM REV CODE 250: Mod: ER | Performed by: EMERGENCY MEDICINE

## 2025-05-19 PROCEDURE — 99283 EMERGENCY DEPT VISIT LOW MDM: CPT | Mod: 25,ER

## 2025-05-19 PROCEDURE — 93005 ELECTROCARDIOGRAM TRACING: CPT | Mod: ER

## 2025-05-19 RX ORDER — ALUMINUM HYDROXIDE, MAGNESIUM HYDROXIDE, AND SIMETHICONE 1200; 120; 1200 MG/30ML; MG/30ML; MG/30ML
30 SUSPENSION ORAL
Status: COMPLETED | OUTPATIENT
Start: 2025-05-19 | End: 2025-05-19

## 2025-05-19 RX ORDER — LIDOCAINE HYDROCHLORIDE 20 MG/ML
5 SOLUTION OROPHARYNGEAL
Status: COMPLETED | OUTPATIENT
Start: 2025-05-19 | End: 2025-05-19

## 2025-05-19 RX ORDER — HYDROXYZINE HYDROCHLORIDE 25 MG/1
25 TABLET, FILM COATED ORAL 3 TIMES DAILY
COMMUNITY

## 2025-05-19 RX ORDER — METOPROLOL SUCCINATE 50 MG/1
75 TABLET, EXTENDED RELEASE ORAL DAILY
Qty: 135 TABLET | Refills: 3 | Status: SHIPPED | OUTPATIENT
Start: 2025-05-19 | End: 2026-05-19

## 2025-05-19 RX ORDER — HYDROXYZINE PAMOATE 25 MG/1
25 CAPSULE ORAL
Status: COMPLETED | OUTPATIENT
Start: 2025-05-19 | End: 2025-05-19

## 2025-05-19 RX ADMIN — HYDROXYZINE PAMOATE 25 MG: 25 CAPSULE ORAL at 12:05

## 2025-05-19 RX ADMIN — LIDOCAINE HYDROCHLORIDE 5 ML: 20 SOLUTION ORAL at 12:05

## 2025-05-19 RX ADMIN — ALUMINUM HYDROXIDE, MAGNESIUM HYDROXIDE, AND SIMETHICONE 30 ML: 200; 200; 20 SUSPENSION ORAL at 12:05

## 2025-05-19 NOTE — ED PROVIDER NOTES
Encounter Date: 5/19/2025       History     Chief Complaint   Patient presents with    Chest Pain     Pt c/o chest pain starting an hour ago, wakening her out of sleep. Describes pain as constant , burning and squeezing. Reports had siddhartha walsh for dinner      General Illness     HPI  25 y.o.  Co SS nr mod chest pain x 30 min that woke her up from sleep  Feels like crushing  Had had this several x before  No f/c/cough no leg pain nor swelling  No exac/remitting factors    Review of patient's allergies indicates:   Allergen Reactions    Sulfa (sulfonamide antibiotics) Hives    Crab Diarrhea and Nausea And Vomiting     Aubrey crabs     Past Medical History:   Diagnosis Date    Carpal tunnel syndrome on right     History of bladder infections     as a child    Polycystic ovarian disease     Scoliosis     Vitamin D deficiency      Past Surgical History:   Procedure Laterality Date    ANGIOGRAM, RENAL, UNILATERAL, PEDIATRIC Left     no sedation given    CARPAL TUNNEL RELEASE Right 1/8/2025    Procedure: RELEASE, CARPAL TUNNEL;  Surgeon: Bob Lima Jr., MD;  Location: [facility];  Service: Orthopedics;  Laterality: Right;    TONSILLECTOMY, ADENOIDECTOMY      WISDOM TOOTH EXTRACTION       Family History   Problem Relation Name Age of Onset    Hypertension Father      Hypertension Mother       Social History[1]  Review of Systems  All systems were reviewed/examined and were negative except as noted in the HPI.    Physical Exam     Initial Vitals [05/19/25 0035]   BP Pulse Resp Temp SpO2   125/79 (!) 117 18 99.2 °F (37.3 °C) 100 %      MAP       --         Physical Exam    General: the patient is awake, alert, and in no apparent distress.  Head: normocephalic and atraumatic, sclera are clear  Neck: supple without meningismus  Chest: clear to auscultation bilaterally, no respiratory distress  Heart: regular rate and rhythm  ABD soft, nontender, nondistended, no peritoneal signs  No calf t no edema  Extremities: warm and  well perfused  Skin: warm and dry  Psych conversant  Neuro: awake, alert, moving all extremities    ED Course   Procedures  Labs Reviewed - No data to display     ECG Results              EKG 12-lead (Final result)        Collection Time Result Time QRS Duration OHS QTC Calculation    05/19/25 00:39:24 05/19/25 21:52:06 72 463                     [facility] (05/19/25 21:52:13)                   Narrative:    Test Reason : R07.9,    Vent. Rate : 109 BPM     Atrial Rate : 109 BPM     P-R Int : 146 ms          QRS Dur :  72 ms      QT Int : 344 ms       P-R-T Axes :  52  68  42 degrees    QTcB Int : 463 ms    Sinus tachycardia  Otherwise normal ECG  When compared with ECG of 19-Apr-2025 20:41,  No significant change was found  Confirmed by Alex Vanegas ([id number]) on 5/19/2025 9:52:01 PM    Referred By: AAAREFERRAL SELF           Confirmed By: Alex Lynn                                  Imaging Results    None          Medications   aluminum-magnesium hydroxide-simethicone 200-200-20 mg/5 mL suspension 30 mL (30 mLs Oral Given 5/19/25 0046)   LIDOcaine viscous HCl 2% oral solution 5 mL (5 mLs Oral Given 5/19/25 0046)   hydrOXYzine pamoate capsule 25 mg (25 mg Oral Given 5/19/25 0046)     Medical Decision Making  Risk  OTC drugs.  Prescription drug management.       Medical Decision Making:    This is an emergent evaluation of a patient presenting to the ED.  Nursing notes were reviewed.  I personally reviewed, read, and interpreted the ECG and any monitoring strips.  ECG: unchanged from previous tracings, sinus tachycardia   There are no critical interval prolongations nor critical ST-segment ischemic changes.  Compared with prior if available.  Read and interpreted by me independently.    Labs non critical  I personally reviewed and interpreted the laboratory results.    I decided to obtain and review old medical records, which showed: ED visits    Evaluation for Emergency  Medical Condition  The patient received a medical screening exam and within a reasonable degree of clinical confidence an emergency medical condition has not been identified.  The patient is instructed on proper follow up and return precautions to the ED.      David Wei MD, LESLIE                                 Clinical Impression:  Final diagnoses:  [R07.9] Chest pain          ED Disposition Condition    Discharge Stable          ED Prescriptions    None       Follow-up Information       Follow up With Specialties Details Why Contact Info    Gary Steele MD Interventional Cardiology, Cardiology Schedule an appointment as soon as possible for a visit   [address]  [address]  [facility] [address] [phone]            Discharged to home in stable condition, return to ED warnings given, follow up and patient care instructions given.      David Wei MD, LESLIE, [facility]  Department of Emergency Medicine         [1]   Social History  Tobacco Use    Smoking status: Never     Passive exposure: Yes    Smokeless tobacco: Never   Substance Use Topics    Alcohol use: Yes     Comment: occ    Drug use: No        Abran Wei MD  05/21/25 0309

## 2025-06-02 ENCOUNTER — PATIENT MESSAGE (OUTPATIENT)
Dept: ADMINISTRATIVE | Facility: OTHER | Age: 25
End: 2025-06-02
Payer: MEDICAID

## 2025-06-10 ENCOUNTER — PATIENT MESSAGE (OUTPATIENT)
Dept: CARDIOLOGY | Facility: CLINIC | Age: 25
End: 2025-06-10
Payer: MEDICAID

## 2025-06-11 ENCOUNTER — TELEPHONE (OUTPATIENT)
Dept: CARDIOLOGY | Facility: CLINIC | Age: 25
End: 2025-06-11
Payer: MEDICAID

## 2025-06-11 NOTE — TELEPHONE ENCOUNTER
----- Message from Heidy sent at 6/11/2025  4:02 PM CDT -----  Type:  Needs Medical AdviceWho Called: PtSymptoms (please be specific): Medication concern, b/pWould the patient rather a call back or a response via [facility]? PtBest Call Back Number: [phone] Information: Pt states b/p dropped to 94/47 with medication.  Pt would like to know if she should continue with dosage as prescribed or should she decrease the dosage to 50mg.  Pt states B/P reading on today 1322/86 upon sitting up and laying down 108/55.  Pt would like to speak with nurse in office.

## 2025-07-29 ENCOUNTER — HOSPITAL ENCOUNTER (EMERGENCY)
Facility: HOSPITAL | Age: 25
Discharge: HOME | End: 2025-07-29
Attending: STUDENT IN AN ORGANIZED HEALTH CARE EDUCATION/TRAINING PROGRAM
Payer: MEDICAID

## 2025-07-29 VITALS
HEIGHT: 60 IN | OXYGEN SATURATION: 97 % | HEART RATE: 106 BPM | DIASTOLIC BLOOD PRESSURE: 69 MMHG | SYSTOLIC BLOOD PRESSURE: 126 MMHG | RESPIRATION RATE: 17 BRPM | BODY MASS INDEX: 56.93 KG/M2 | TEMPERATURE: 99 F | WEIGHT: 290 LBS

## 2025-07-29 DIAGNOSIS — L02.429 BOIL, THIGH: Primary | ICD-10-CM

## 2025-07-29 LAB
B-HCG UR QL: NEGATIVE
CTP QC/QA: YES

## 2025-07-29 PROCEDURE — 99282 EMERGENCY DEPT VISIT SF MDM: CPT | Mod: ER

## 2025-07-29 PROCEDURE — 81025 URINE PREGNANCY TEST: CPT | Mod: ER

## 2025-07-29 NOTE — DISCHARGE INSTRUCTIONS
Thank you for letting me care for you today - it was nice to meet you and I hope you feel better soon.     Our goal at [facility] is to always give you outstanding care and exceptional service. You may receive a survey by mail or email in the next week about your experience in our ED. We would greatly appreciate you completing and returning the survey. Your feedback provides us with a way to recognize our staff who give very good care and it helps us learn how to improve when your experience was below our aspiration of excellence.     All the best,     Daisha Valdez PA-C  Emergency Department Physician Assistant  [facility] [place]

## 2025-07-29 NOTE — ED PROVIDER NOTES
Encounter Date: 7/29/2025       History     Chief Complaint   Patient presents with    Abscess     PT states I have big boil on the inner right thigh that is red and irritated and hurts when I walk. Pt denies drainage from boil. States its been there about 4 days.      25-year-old female with PMH of PCOS, scoliosis, vitamin-D deficiency presenting to the ED with concerns of a boil to her right inner thigh that has been present for the last 4 days.  She states she gets these frequently due to her thighs rubbing together but they usually resolve on their own.  She states she has tried to squeeze this one and has not been able to get anything out of it.  She denies any fever, chills, abdominal pain, nausea, vomiting, or any other symptoms at this time.    The history is provided by the patient. No  was used.     Review of patient's allergies indicates:   Allergen Reactions    Sulfa (sulfonamide antibiotics) Hives    Crab Diarrhea and Nausea And Vomiting     Aubrey crabs     Past Medical History:   Diagnosis Date    Carpal tunnel syndrome on right     History of bladder infections     as a child    Polycystic ovarian disease     Scoliosis     Vitamin D deficiency      Past Surgical History:   Procedure Laterality Date    ANGIOGRAM, RENAL, UNILATERAL, PEDIATRIC Left     no sedation given    CARPAL TUNNEL RELEASE Right 1/8/2025    Procedure: RELEASE, CARPAL TUNNEL;  Surgeon: Bob Lima Jr., MD;  Location: [facility] OR;  Service: Orthopedics;  Laterality: Right;    TONSILLECTOMY, ADENOIDECTOMY      WISDOM TOOTH EXTRACTION       Family History   Problem Relation Name Age of Onset    Hypertension Father      Hypertension Mother       Social History[1]  Review of Systems   Constitutional:  Negative for chills and fever.   HENT:  Negative for sore throat.    Respiratory:  Negative for shortness of breath.    Cardiovascular:  Negative for chest pain.   Gastrointestinal:  Negative for nausea.   Genitourinary:   Negative for dysuria.   Musculoskeletal:  Negative for back pain.   Skin:  Negative for rash.        Positive for boil   Neurological:  Negative for weakness.   Hematological:  Does not bruise/bleed easily.   All other systems reviewed and are negative.      Physical Exam     Initial Vitals [07/29/25 1637]   BP Pulse Resp Temp SpO2   126/69 106 17 98.7 °F (37.1 °C) 97 %      MAP       --         Physical Exam    Constitutional: Vital signs are normal. She appears well-developed and well-nourished. She is not diaphoretic.  Non-toxic appearance. She does not have a sickly appearance. No distress.   HENT:   Head: Normocephalic and atraumatic.   Eyes: Conjunctivae are normal.   Neck: Neck supple.   Normal range of motion.  Cardiovascular:  Normal rate.           Pulmonary/Chest: Effort normal. No respiratory distress.   Musculoskeletal:      Cervical back: Normal range of motion and neck supple.     Neurological: She is alert and oriented to person, place, and time. GCS score is 15. GCS eye subscore is 4. GCS verbal subscore is 5. GCS motor subscore is 6.   Skin:   Small boil present to right medial thigh.  Tender to palpation.  No fluctuance, induration, or purulent drainage present.  Appears to have already popped.   Psychiatric: She has a normal mood and affect. Her behavior is normal. Judgment and thought content normal.         ED Course   Procedures  Labs Reviewed   POCT URINE PREGNANCY       Result Value    POC Preg Test, Ur Negative       Acceptable Yes            Imaging Results    None          Medications - No data to display  Medical Decision Making  Pleasant, well-appearing 25-year-old female in no acute distress.  Afebrile.  Vitals WNL.  Alert and oriented.  Small boil with tenderness to palpation present to medial right thigh. No induration, fluctuance, or surrounding erythema. Appears to have spontaneously popped. Betadine applied and needle aspiration with 18g needed with no purulent  drainage expressed.  Dressed with bandage.  Stable for discharge.  Wound care instructions given.  Advised to keep clean and dry.  Apply warm compresses as needed.  Advised close follow up with PCP.  Strict ED return precautions discussed.  Patient verbalized understanding and agreement with plan.  All questions answered.  Case reviewed with ED attending, Dr. Trejo.    Differential Diagnosis includes, but is not limited to:  Cellulitis, abscess, necrotizing fasciitis, osteomyelitis, septic joint, MRSA, DVT, drug eruption, allergic/contact dermatitis, EM/SJS, viral exanthem, local trauma/contusion      Amount and/or Complexity of Data Reviewed  Labs: ordered. Decision-making details documented in ED Course.               ED Course as of 07/29/25 1847 Tue Jul 29, 2025   1655 POCT urine pregnancy [KG]   1703 BP: 126/69 [KG]   1703 Temp: 98.7 °F (37.1 °C) [KG]   1703 Pulse: 106 [KG]   1703 Resp: 17 [KG]   1703 SpO2: 97 % [KG]      ED Course User Index  [KG] Daisha Valdez PA-C                               Clinical Impression:  Final diagnoses:  [L02.429] Boil, thigh (Primary)          ED Disposition Condition    Discharge Stable          ED Prescriptions    None       Follow-up Information       Follow up With Specialties Details Why Contact Info Additional Information    [facility]   [address] [address] [phone] Please park in [address] or [address] and use [address]. Take Medical Office Bldg. elevators.                   [1]   Social History  Tobacco Use    Smoking status: Never     Passive exposure: Yes    Smokeless tobacco: Never   Substance Use Topics    Alcohol use: Yes     Comment: occ    Drug use: No        Daisha Valdez PA-C  07/29/25 1847

## 2025-08-05 ENCOUNTER — HOSPITAL ENCOUNTER (EMERGENCY)
Facility: HOSPITAL | Age: 25
Discharge: HOME | End: 2025-08-05
Attending: EMERGENCY MEDICINE
Payer: MEDICAID

## 2025-08-05 VITALS
TEMPERATURE: 99 F | DIASTOLIC BLOOD PRESSURE: 81 MMHG | HEART RATE: 92 BPM | WEIGHT: 285 LBS | OXYGEN SATURATION: 99 % | HEIGHT: 60 IN | SYSTOLIC BLOOD PRESSURE: 140 MMHG | BODY MASS INDEX: 55.95 KG/M2 | RESPIRATION RATE: 18 BRPM

## 2025-08-05 DIAGNOSIS — S39.012A STRAIN OF LUMBAR REGION, INITIAL ENCOUNTER: Primary | ICD-10-CM

## 2025-08-05 LAB
B-HCG UR QL: NEGATIVE
CTP QC/QA: YES

## 2025-08-05 PROCEDURE — 99283 EMERGENCY DEPT VISIT LOW MDM: CPT | Mod: 25,ER

## 2025-08-05 PROCEDURE — 81025 URINE PREGNANCY TEST: CPT | Mod: ER | Performed by: EMERGENCY MEDICINE

## 2025-08-05 PROCEDURE — 25000003 PHARM REV CODE 250: Mod: ER | Performed by: EMERGENCY MEDICINE

## 2025-08-05 RX ORDER — IBUPROFEN 600 MG/1
600 TABLET, FILM COATED ORAL
Status: COMPLETED | OUTPATIENT
Start: 2025-08-05 | End: 2025-08-05

## 2025-08-05 RX ORDER — IBUPROFEN 600 MG/1
600 TABLET, FILM COATED ORAL EVERY 6 HOURS PRN
Qty: 20 TABLET | Refills: 0 | Status: SHIPPED | OUTPATIENT
Start: 2025-08-05

## 2025-08-05 RX ADMIN — IBUPROFEN 600 MG: 600 TABLET ORAL at 08:08
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
## 2025-08-12 ENCOUNTER — TELEPHONE (OUTPATIENT)
Dept: CARDIOLOGY | Facility: CLINIC | Age: 25
End: 2025-08-12
Payer: MEDICAID

## 2025-08-13 ENCOUNTER — HOSPITAL ENCOUNTER (OUTPATIENT)
Dept: CARDIOLOGY | Facility: HOSPITAL | Age: 25
Discharge: HOME | End: 2025-08-13
Attending: INTERNAL MEDICINE
Payer: MEDICAID

## 2025-08-13 VITALS — BODY MASS INDEX: 55.95 KG/M2 | HEIGHT: 60 IN | WEIGHT: 285 LBS

## 2025-08-13 DIAGNOSIS — G90.A POTS (POSTURAL ORTHOSTATIC TACHYCARDIA SYNDROME): ICD-10-CM

## 2025-08-13 LAB — OHS CV CPX PATIENT HEIGHT IN: 60

## 2025-08-13 PROCEDURE — 93660 TILT TABLE EVALUATION: CPT

## 2025-08-13 PROCEDURE — 93660 TILT TABLE EVALUATION: CPT | Mod: 26,,, | Performed by: INTERNAL MEDICINE

## 2025-08-14 ENCOUNTER — PATIENT MESSAGE (OUTPATIENT)
Dept: CARDIOLOGY | Facility: CLINIC | Age: 25
End: 2025-08-14
Payer: MEDICAID

## 2025-08-18 ENCOUNTER — OFFICE VISIT (OUTPATIENT)
Dept: CARDIOLOGY | Facility: CLINIC | Age: 25
End: 2025-08-18
Payer: MEDICAID

## 2025-08-18 VITALS
SYSTOLIC BLOOD PRESSURE: 86 MMHG | OXYGEN SATURATION: 98 % | HEIGHT: 64 IN | WEIGHT: 293 LBS | HEART RATE: 79 BPM | DIASTOLIC BLOOD PRESSURE: 55 MMHG | BODY MASS INDEX: 50.02 KG/M2

## 2025-08-18 DIAGNOSIS — E66.813 CLASS 3 SEVERE OBESITY DUE TO EXCESS CALORIES WITH BODY MASS INDEX (BMI) OF 50.0 TO 59.9 IN ADULT: Chronic | ICD-10-CM

## 2025-08-18 DIAGNOSIS — G90.A POTS (POSTURAL ORTHOSTATIC TACHYCARDIA SYNDROME): Primary | Chronic | ICD-10-CM

## 2025-08-18 PROBLEM — I47.10 SVT (SUPRAVENTRICULAR TACHYCARDIA): Status: RESOLVED | Noted: 2025-04-09 | Resolved: 2025-08-18

## 2025-08-18 PROCEDURE — G2211 COMPLEX E/M VISIT ADD ON: HCPCS | Mod: ,,, | Performed by: INTERNAL MEDICINE

## 2025-08-18 PROCEDURE — 3074F SYST BP LT 130 MM HG: CPT | Mod: CPTII,,, | Performed by: INTERNAL MEDICINE

## 2025-08-18 PROCEDURE — 99214 OFFICE O/P EST MOD 30 MIN: CPT | Mod: S$PBB,,, | Performed by: INTERNAL MEDICINE

## 2025-08-18 PROCEDURE — 99213 OFFICE O/P EST LOW 20 MIN: CPT | Mod: PBBFAC,PO | Performed by: INTERNAL MEDICINE

## 2025-08-18 PROCEDURE — 3008F BODY MASS INDEX DOCD: CPT | Mod: CPTII,,, | Performed by: INTERNAL MEDICINE

## 2025-08-18 PROCEDURE — 3078F DIAST BP <80 MM HG: CPT | Mod: CPTII,,, | Performed by: INTERNAL MEDICINE

## 2025-08-18 PROCEDURE — 99999 PR PBB SHADOW E&M-EST. PATIENT-LVL III: CPT | Mod: PBBFAC,,, | Performed by: INTERNAL MEDICINE

## 2025-08-18 PROCEDURE — 1159F MED LIST DOCD IN RCRD: CPT | Mod: CPTII,,, | Performed by: INTERNAL MEDICINE

## 2025-08-18 RX ORDER — METOPROLOL SUCCINATE 50 MG/1
50 TABLET, EXTENDED RELEASE ORAL DAILY
Qty: 90 TABLET | Refills: 3 | Status: SHIPPED | OUTPATIENT
Start: 2025-08-18 | End: 2026-08-18